# Patient Record
Sex: FEMALE | Race: WHITE | NOT HISPANIC OR LATINO | Employment: UNEMPLOYED | URBAN - METROPOLITAN AREA
[De-identification: names, ages, dates, MRNs, and addresses within clinical notes are randomized per-mention and may not be internally consistent; named-entity substitution may affect disease eponyms.]

---

## 2023-05-18 NOTE — PROGRESS NOTES
"[facility]ICAL [facility]AM[facility]    Patient: Kristin Fitzgerald    YOB: 1995  Sex:  female    Date of Exam: 5/19/23  MyChart Status: Activated    Arrival Time: 09 03 AM  Departure Time: 10 05 AM    Charge Phone (written on paperwork): [phone]    Vitals: /76   Pulse 76   Temp 98.4  F (36.9  C) (Oral)   Ht 1.74 m (5' 8.5\")   Wt 88.9 kg (196 lb)   SpO2 96%   BMI 29.37 kg/m      Patient Concerns:   Chief Complaint   Patient presents with     Physical      Kristin Fitzgerald presents to the clinic today for a [facility] physical exam. They have been there since April 4th, 2023. This is the patient's second time in treatment. Reports their drug of choice is/was alcohol , prescription drugs  and amphetamines, last used 2/13/2023. Patient denies a history of IVDU and endorses a history of incarceration for approximately [2 months]. Patient's living situation prior to treatment: lives with roommates. Report their last episode of [facility]IC was January 2023, no active genitourinary symptoms. Kristin Fitzgerald denies current chest pain, palpitations, SOB, cough, fever, chills, malaise, N/V/D, night sweats, unintentional weight loss, or abdominal pain.   Current health complaints: None    Has Mirena IUD. Placed 6 years when daughter was born.  Last PAP 2020 normal.     PHQ-2 Score:        5/19/2023     9:09 AM   PHQ-2 ( 1999 Pfizer)   Q1: Little interest or pleasure in doing things 1   Q2: Feeling down, depressed or hopeless 1   PHQ-2 Score 2      PHQ-9 score:        5/19/2023     9:09 AM   PHQ   PHQ-9 Total Score 4   Q9: Thoughts of better off dead/self-harm past 2 weeks Not at all       Medications:   Current Outpatient Medications   Medication Sig Dispense Refill     acetaminophen (TYLENOL) 500 MG tablet Take 500-1,000 mg by mouth every 6 hours as needed for mild pain       buPROPion (WELLBUTRIN XL) 150 MG 24 hr tablet Take 150 mg by mouth every morning       gabapentin (NEURONTIN) " [facility] ENCOUNTER  ORTHOPEDIC OFFICE VISIT    CHIEF COMPLAINT:  Office Visit (Left knee pain )      SUBJECTIVE:  [place] Em is a 65 year old female who presented requesting evaluation for left knee pain.  Patient reports she has had spine problems for years and has pain that radiates into her whole leg.  Patient reports pain along the global knee.  Describes as aching, burning, stabbing and sharp pain.  Patient reports she has seen Dr. Malone on Oct. 13th, for a hip injection that was beneficial.  Pain is worse with activity and relieved with rest.  She has had a left knee MRI done also.  She has also had a knee injection that was beneficial for about 30-35% of her pain.  Patient's  attended the appointment with her today.      PROBLEM LIST:  Patient Active Problem List   Diagnosis   • Obstructive sleep apnea (adult) (pediatric)   • Cervical spondylosis with myelopathy   • Spondylolisthesis of lumbar region   • Essential hypertension   • Depression with anxiety   • Plantar fasciitis of left foot   • History of alcohol abuse   • Colon polyps   • Family history of colon cancer   • Class 2 severe obesity due to excess calories with serious comorbidity and body mass index (BMI) of 35.0 to 35.9 in adult (CMS/HCC)   • Chronic pain of left lower extremity   • Generalized OA   • Peroneal tendinitis of left lower extremity   • RSD (reflex sympathetic dystrophy)   • Complex regional pain syndrome type 1 of left lower extremity   • Left foot pain   • Weakness of both lower extremities   • Impaired functional mobility, balance, gait, and endurance   • Neuritis of left sural nerve   • VICKIE on CPAP   • Esophageal dysmotility   • Preop examination   • Medicare annual wellness visit, subsequent   • GERD (gastroesophageal reflux disease)   • Escalante's esophagus without dysplasia   • Skin ulcer of third toe of left foot, limited to breakdown of skin (CMS/HCC)   • Major depressive disorder, recurrent episode,  400 MG capsule Take 400 mg by mouth 3 times daily       ibuprofen (ADVIL/MOTRIN) 800 MG tablet Take 800 mg by mouth every 8 hours as needed for moderate pain       Lidocaine (LIDOCAINE PAIN RELIEF) 4 % Patch Place onto the skin every 24 hours To prevent lidocaine toxicity, patient should be patch free for 12 hrs daily.       Melatonin 10 MG TABS tablet Take 10 mg by mouth nightly as needed for sleep       multivitamin w/minerals (MULTI-VITAMIN) tablet Take 1 tablet by mouth daily       naltrexone (DEPADE/REVIA) 50 MG tablet Take 50 mg by mouth daily       QUEtiapine (SEROQUEL) 50 MG tablet Take 50 mg by mouth 2 times daily       Vitamin D3 50 mcg (2000 units) tablet Take 1 tablet by mouth daily         ROS:  Review Of Systems  See HPI    General Physical Exam:  Constitutional:   awake, alert, cooperative, no apparent distress, and appears stated age   Eyes:   Lids and lashes normal, pupils equal, round and reactive to light, extra ocular muscles intact, sclera clear, conjunctiva normal   ENT:   Normocephalic, without obvious abnormality, atraumatic, sinuses nontender on palpation, external ears without lesions, oral pharynx with moist mucous membranes, tonsils without erythema or exudates, gums normal and good dentition.   Neck:   Supple, symmetrical, trachea midline, no adenopathy, thyroid symmetric, not enlarged and no tenderness, skin normal   Hematologic / Lymphatic:   no cervical lymphadenopathy   Back:   Symmetric, no curvature, spinous processes are non-tender on palpation, paraspinous muscles are non-tender on palpation, no costal vertebral tenderness   Lungs:   No increased work of breathing, good air exchange, clear to auscultation bilaterally, no crackles or wheezing   Cardiovascular:   Normal apical impulse, regular rate and rhythm, normal S1 and S2, no S3 or S4, and no murmur noted   Abdomen:   No scars, normal bowel sounds, soft, non-distended, non-tender, no masses palpated, no hepatosplenomegally  mild (CMS/HCC)     HISTORIES:  I have reviewed the past medical history, family history, social history, medications and allergies listed in the medical record as obtained by my nursing staff and support staff and agree with their documentation.  Social History     Tobacco Use   • Smoking status: Former Smoker     Packs/day: 0.75     Years: 48.00     Pack years: 36.00     Types: Cigarettes     Quit date: 10/25/2018     Years since quittin.0   • Smokeless tobacco: Never Used   Substance Use Topics   • Alcohol use: No     Comment: quit oct. 25 of 2018     REVIEW OF SYSTEMS:  12 point review as mentioned above.          OBJECTIVE:  PHYSICAL EXAMINATION:  Vitals:   Visit Vitals  Ht 5' 5\" (1.651 m)   Wt 88 kg (194 lb 0.1 oz)   BMI 32.28 kg/m²     Constitutional:  Well-developed, well-nourished female in no acute distress.  Skin: Warm, dry, intact without rash or lesion.  Neurologic:  Alert and oriented x3.  Lungs:  Respirations unlabored  Musculoskeletal:  Left knee  Mild to moderate patellofemoral crepitus  Full range of motion    No Effusion  Stable to varus & valgus stress  Medial Joint line tenderness; no lateral joint line tenderness    IMAGING STUDIES:   MRI results of the left knee were reviewed.  These demonstrate patellofemoral chondromalacia and a medial meniscus tear.         ASSESSMENT:  Left knee patellar arthritis  Left knee medial meniscus tear      PLAN:   Discussed arthroscopic left knee surgery vs total knee arthroplasty with the patient.  The patient agrees to schedule the surgery at this time.       Physician directed home exercise program provided for left knee.     Schedule a pre-op exam within 30 days before surgery.     Follow up 2 weeks post-op.        No orders of the defined types were placed in this encounter.    Instructions provided as documented in the after visit summary.    The patient indicated understanding of the diagnosis and agreed with the plan of care.    On 10/26/2022, Suad ELIZABETH    Chest / Breast:   Breasts symmetrical, skin without lesion(s), no nipple retraction or dimpling, no nipple discharge, no masses palpated, no axillary or supraclavicular adenopathy   Genitounirinary:   Not indicated today   Musculoskeletal:   There is no redness, warmth, or swelling of the joints.  Full range of motion noted.  Motor strength is 5 out of 5 all extremities bilaterally.  Tone is normal.   Neurologic:   Awake, alert, oriented to name, place and time.  Cranial nerves II-XII are grossly intact.  Motor is 5 out of 5 bilaterally.  Cerebellar finger to nose, heel to shin intact.  Sensory is intact.  Babinski down going, Romberg negative, and gait is normal.   Neuropsychiatric:   General: normal, calm and normal eye contact   Skin:   normal skin color, texture, turgor       All labs required for [facility] will be completed during this visit: HIV, Hepatitis A (IgM &IgG), Hepatitis B (IgM &IgG), Hepatitis C, Quantiferon Gold, Pregnancy Test     Allergies: No Known Allergies    Are there any conditions that may endanger the health of the staff or Clients in our residential program? No     Is there any reason why this applicant should not assist in the preparation of food? No    This applicant is okay to admit for services to [facility]? Yes     The above client is a mutual patient at [facility], and the [facility] would like our patient to continue taking her medication as prescribed upon discharging from [facility].     I authorize the above patient to take all of her medication with her upon discharging from [facility]. Yes     Diagnoses:     (Z00.00) Routine history and physical examination of adult  (primary encounter diagnosis)  Plan: Lipid panel    (Z59.3) Person living in residential institution  Plan: Hepatitis C Screen Reflex to HCV RNA Quant and         Genotype, HIV  GALEN Downey scribed the services personally performed by Emilia Bowman MD.      The documentation recorded by the scribe accurately and completely reflects the service(s) I personally performed and the decisions made by me.        Antigen Antibody Combo, Hepatitis        A Antibody IgG, Hepatitis A antibody IgM,         Hepatitis B Surface Antibody, Hepatitis B         surface antigen, Quantiferon-TB Gold Plus, HCG         qualitative urine  - Physical exam unremarkable  - No s/sx of infectious or communicable diseases   - Vocalizes commitment to treatment center's program to maintain sobriety    (Z11.3) Routine screening for STI (sexually transmitted infection)  Plan: HIV Antigen Antibody Combo, Chlamydia         trachomatis/Neisseria gonorrhoeae by PCR -         Clinic Collect, Trichomonas vaginalis DNA PCR,         Treponema Abs w Reflex to RPR and Titer    Medication Changes: MEDICATIONS:        - Continue other medications without change    Referrals   Please schedule with a dentist for routine cleaning.    Follow up plan   Follow up as needed. PAP due Dec 2023.    Tatiana Freedman NP     Fax completed forms to: [phone]

## 2023-05-19 ENCOUNTER — OFFICE VISIT (OUTPATIENT)
Dept: FAMILY MEDICINE | Facility: CLINIC | Age: 28
End: 2023-05-19
Payer: MEDICAID

## 2023-05-19 VITALS
BODY MASS INDEX: 29.03 KG/M2 | DIASTOLIC BLOOD PRESSURE: 76 MMHG | HEART RATE: 76 BPM | TEMPERATURE: 98.4 F | SYSTOLIC BLOOD PRESSURE: 110 MMHG | HEIGHT: 69 IN | OXYGEN SATURATION: 96 % | WEIGHT: 196 LBS

## 2023-05-19 DIAGNOSIS — Z78.9 PERSON LIVING IN RESIDENTIAL INSTITUTION: ICD-10-CM

## 2023-05-19 DIAGNOSIS — Z11.3 ROUTINE SCREENING FOR STI (SEXUALLY TRANSMITTED INFECTION): ICD-10-CM

## 2023-05-19 DIAGNOSIS — Z00.00 ROUTINE HISTORY AND PHYSICAL EXAMINATION OF ADULT: Primary | ICD-10-CM

## 2023-05-19 LAB
HCG UR QL: NEGATIVE
T VAGINALIS DNA SPEC QL NAA+PROBE: NOT DETECTED

## 2023-05-19 PROCEDURE — 87389 HIV-1 AG W/HIV-1&-2 AB AG IA: CPT | Mod: ORL

## 2023-05-19 PROCEDURE — 86706 HEP B SURFACE ANTIBODY: CPT | Mod: ORL

## 2023-05-19 PROCEDURE — 86481 TB AG RESPONSE T-CELL SUSP: CPT | Mod: ORL

## 2023-05-19 PROCEDURE — 86780 TREPONEMA PALLIDUM: CPT | Mod: ORL

## 2023-05-19 PROCEDURE — 87591 N.GONORRHOEAE DNA AMP PROB: CPT | Mod: ORL

## 2023-05-19 PROCEDURE — 87661 TRICHOMONAS VAGINALIS AMPLIF: CPT | Mod: ORL

## 2023-05-19 PROCEDURE — 86803 HEPATITIS C AB TEST: CPT | Mod: ORL

## 2023-05-19 PROCEDURE — 86709 HEPATITIS A IGM ANTIBODY: CPT | Mod: ORL

## 2023-05-19 PROCEDURE — 80061 LIPID PANEL: CPT | Mod: ORL

## 2023-05-19 PROCEDURE — 86708 HEPATITIS A ANTIBODY: CPT | Mod: ORL

## 2023-05-19 PROCEDURE — 87340 HEPATITIS B SURFACE AG IA: CPT | Mod: ORL

## 2023-05-19 RX ORDER — IBUPROFEN 800 MG/1
800 TABLET, FILM COATED ORAL EVERY 8 HOURS PRN
COMMUNITY

## 2023-05-19 RX ORDER — PHENOL 1.4 %
10 AEROSOL, SPRAY (ML) MUCOUS MEMBRANE
COMMUNITY
End: 2024-08-01

## 2023-05-19 RX ORDER — CHOLECALCIFEROL (VITAMIN D3) 50 MCG
1 TABLET ORAL DAILY
COMMUNITY
End: 2024-04-30

## 2023-05-19 RX ORDER — NALTREXONE HYDROCHLORIDE 50 MG/1
50 TABLET, FILM COATED ORAL DAILY
COMMUNITY
End: 2024-02-08

## 2023-05-19 RX ORDER — MULTIPLE VITAMINS W/ MINERALS TAB 9MG-400MCG
1 TAB ORAL DAILY
COMMUNITY
End: 2024-08-01

## 2023-05-19 RX ORDER — QUETIAPINE FUMARATE 50 MG/1
50 TABLET, FILM COATED ORAL 2 TIMES DAILY
COMMUNITY
End: 2023-12-04

## 2023-05-19 RX ORDER — LIDOCAINE 4 G/G
PATCH TOPICAL EVERY 24 HOURS
COMMUNITY
End: 2024-08-01

## 2023-05-19 RX ORDER — ACETAMINOPHEN 500 MG
500-1000 TABLET ORAL EVERY 6 HOURS PRN
COMMUNITY

## 2023-05-19 RX ORDER — GABAPENTIN 400 MG/1
400 CAPSULE ORAL 3 TIMES DAILY
COMMUNITY
End: 2023-12-04

## 2023-05-19 RX ORDER — BUPROPION HYDROCHLORIDE 150 MG/1
300 TABLET ORAL EVERY MORNING
COMMUNITY
End: 2024-04-17

## 2023-05-19 ASSESSMENT — ANXIETY QUESTIONNAIRES
6. BECOMING EASILY ANNOYED OR IRRITABLE: SEVERAL DAYS
7. FEELING AFRAID AS IF SOMETHING AWFUL MIGHT HAPPEN: SEVERAL DAYS
GAD7 TOTAL SCORE: 8
2. NOT BEING ABLE TO STOP OR CONTROL WORRYING: MORE THAN HALF THE DAYS
3. WORRYING TOO MUCH ABOUT DIFFERENT THINGS: MORE THAN HALF THE DAYS
GAD7 TOTAL SCORE: 8
IF YOU CHECKED OFF ANY PROBLEMS ON THIS QUESTIONNAIRE, HOW DIFFICULT HAVE THESE PROBLEMS MADE IT FOR YOU TO DO YOUR WORK, TAKE CARE OF THINGS AT HOME, OR GET ALONG WITH OTHER PEOPLE: SOMEWHAT DIFFICULT
5. BEING SO RESTLESS THAT IT IS HARD TO SIT STILL: NOT AT ALL
1. FEELING NERVOUS, ANXIOUS, OR ON EDGE: SEVERAL DAYS

## 2023-05-19 ASSESSMENT — PATIENT HEALTH QUESTIONNAIRE - PHQ9
SUM OF ALL RESPONSES TO PHQ QUESTIONS 1-9: 4
5. POOR APPETITE OR OVEREATING: SEVERAL DAYS

## 2023-05-19 NOTE — NURSING NOTE
"ROOM:2  KARLEY PARKER    Preferred Name: Preethi     How did you hear about us?  Other - [facility]    27 year old  Chief Complaint   Patient presents with     Physical       Blood pressure 110/76, pulse 76, temperature 98.4  F (36.9  C), temperature source Oral, height 1.74 m (5' 8.5\"), weight 88.9 kg (196 lb), SpO2 96 %. Body mass index is 29.37 kg/m .  BP completed using cuff size:        There is no problem list on file for this patient.      Wt Readings from Last 2 Encounters:   05/19/23 88.9 kg (196 lb)     BP Readings from Last 3 Encounters:   05/19/23 110/76       No Known Allergies    Current Outpatient Medications   Medication     acetaminophen (TYLENOL) 500 MG tablet     buPROPion (WELLBUTRIN XL) 150 MG 24 hr tablet     DM-Phenylephrine-Acetaminophen (COLD/FLU DAYTIME RELIEF PO)     gabapentin (NEURONTIN) 400 MG capsule     ibuprofen (ADVIL/MOTRIN) 800 MG tablet     Lidocaine (LIDOCAINE PAIN RELIEF) 4 % Patch     Melatonin 10 MG TABS tablet     multivitamin w/minerals (MULTI-VITAMIN) tablet     naltrexone (DEPADE/REVIA) 50 MG tablet     QUEtiapine (SEROQUEL) 50 MG tablet     Vitamin D3 50 mcg (2000 units) tablet     No current facility-administered medications for this visit.       Social History     Tobacco Use     Smoking status: Former     Types: Cigarettes     Smokeless tobacco: Never   Vaping Use     Vaping status: Former   Substance Use Topics     Alcohol use: Not Currently     Drug use: Not Currently     Types: Methamphetamines     Comment: xanax       Honoring Choices - Health Care Directive Guide offered to patient at time of visit.    Health Maintenance Due   Topic Date Due     YEARLY PREVENTIVE VISIT  Never done     ADVANCE CARE PLANNING  Never done     COVID-19 Vaccine (1) Never done     HIV SCREENING  Never done     HEPATITIS C SCREENING  Never done     PAP  Never done     INFLUENZA VACCINE (1) 09/01/2022         There is no immunization history on file for this patient.    No results found for: " PAP    No lab results found.        5/19/2023     9:09 AM   PHQ-2 ( 1999 Pfizer)   Q1: Little interest or pleasure in doing things 1   Q2: Feeling down, depressed or hopeless 1   PHQ-2 Score 2           5/19/2023     9:09 AM   PHQ-9 SCORE   PHQ-9 Total Score 4           5/19/2023     9:09 AM   SAV-7 SCORE   Total Score 8            View : No data to display.                Demetrio Bedoya    May 19, 2023 9:20 AM

## 2023-05-19 NOTE — LETTER
May 24, 2023      Preethi Fitzgerald  [address] [address]        Dear ,    We are writing to inform you of your test results.    Resulted Orders   Chlamydia trachomatis/Neisseria gonorrhoeae by PCR - Clinic Collect   Result Value Ref Range    Chlamydia Trachomatis Negative Negative      Comment:      Negative for C. trachomatis rRNA by transcription mediated amplification.   A negative result by transcription mediated amplification does not preclude the presence of infection because results are dependent on proper and adequate collection, absence of inhibitors and sufficient rRNA to be detected.    Neisseria gonorrhoeae Negative Negative      Comment:      Negative for N. gonorrhoeae rRNA by transcription mediated amplification. A negative result by transcription mediated amplification does not preclude the presence of C. trachomatis infection because results are dependent on proper and adequate collection, absence of inhibitors and sufficient rRNA to be detected.   Hepatitis C Screen Reflex to HCV RNA Quant and Genotype   Result Value Ref Range    Hepatitis C Antibody Nonreactive Nonreactive    Narrative    Assay performance characteristics have not been established for newborns, infants, and children.   HIV Antigen Antibody Combo   Result Value Ref Range    HIV Antigen Antibody Combo Nonreactive Nonreactive      Comment:      HIV-1 p24 Ag & HIV-1/HIV-2 Ab Not Detected   Hepatitis A Antibody IgG   Result Value Ref Range    Hepatitis A Antibody IgG Reactive (A) Nonreactive    Narrative    This assay cannot be used for the diagnosis of acute HAV infection.   Hepatitis A antibody IgM   Result Value Ref Range    Hepatitis A Antibody IgM Nonreactive Nonreactive      Comment:      IgM anti-HAV not detected. Does not exclude the possibility of recent exposure to or infection with HAV.   Hepatitis B Surface Antibody   Result Value Ref Range    Hepatitis B Surface Antibody Instrument Value 28.04  <8.00 m[IU]/mL    Hepatitis B Surface Antibody Reactive       Comment:      Patient is considered to be immune to infection with hepatitis B when the value is greater than or equal to 12.00 mIU/mL.   Hepatitis B surface antigen   Result Value Ref Range    Hepatitis B Surface Antigen Nonreactive Nonreactive   HCG qualitative urine   Result Value Ref Range    hCG Urine Qualitative Negative Negative      Comment:      This test is for screening purposes.  Results should be interpreted along with the clinical picture.  Confirmation testing is available if warranted by ordering [id number], HCG Quantitative Pregnancy.   Trichomonas vaginalis DNA PCR   Result Value Ref Range    Trichomonas vaginalis by PCR Not Detected Not Detected    Narrative    [employer] Xpert TV Assay, performed on the [employer], is a qualitative in vitro diagnostic test for the detection of Trichomonas vaginalis genomic DNA. The test utilizes automated real-time polymerase chain reaction (PCR). The Xpert TV Assay uses female and male urine specimens, endocervical swab specimens, or patient-collected vaginal swab specimens (collected in a clinical setting). The Xpert TV Assay is intended to aid in the diagnosis of trichomoniasis in symptomatic or asymptomatic individuals.   Treponema Abs w Reflex to RPR and Titer   Result Value Ref Range    Treponema Antibody Total Nonreactive Nonreactive   Lipid panel   Result Value Ref Range    Cholesterol 209 (H) <200 mg/dL    Triglycerides 96 <150 mg/dL    Direct Measure HDL 65 >=50 mg/dL    LDL Cholesterol Calculated 125 (H) <=100 mg/dL    Non HDL Cholesterol 144 (H) <130 mg/dL    Narrative    Cholesterol  Desirable:  <200 mg/dL    Triglycerides  Normal:  Less than 150 mg/dL  Borderline High:  150-199 mg/dL  High:  200-499 mg/dL  Very High:  Greater than or equal to 500 mg/dL    Direct Measure HDL  Female:  Greater than or equal to 50 mg/dL   Male:  Greater than or equal to 40 mg/dL    LDL  Cholesterol  Desirable:  <100mg/dL  Above Desirable:  100-129 mg/dL   Borderline High:  130-159 mg/dL   High:  160-189 mg/dL   Very High:  >= 190 mg/dL    Non HDL Cholesterol  Desirable:  130 mg/dL  Above Desirable:  130-159 mg/dL  Borderline High:  160-189 mg/dL  High:  190-219 mg/dL  Very High:  Greater than or equal to 220 mg/dL   Quantiferon TB Gold Plus Grey Tube   Result Value Ref Range    Quantiferon Nil Tube 0.27 IU/mL   Quantiferon TB Gold Plus Green Tube   Result Value Ref Range    Quantiferon TB1 Tube 0.38 IU/mL   Quantiferon TB Gold Plus Yellow Tube   Result Value Ref Range    Quantiferon TB2 Tube 0.54    Quantiferon TB Gold Plus Purple Tube   Result Value Ref Range    Quantiferon Mitogen 10.00 IU/mL   Quantiferon TB Gold Plus   Result Value Ref Range    Quantiferon-TB Gold Plus Negative Negative      Comment:      No interferon gamma response to M.tuberculosis antigens was detected. Infection with M.tuberculosis is unlikely, however a single negative result does not exclude infection. In patients at high risk for infection, a second test should be considered in accordance with the 2017 ATS/IDSA/CDC Clinical Pract  ice Guidelines for Diagnosis of Tuberculosis in Adults and Children     TB1 Ag minus Nil Value 0.11 IU/mL    TB2 Ag minus Nil Value 0.27 IU/mL    Mitogen minus Nil Result 9.73 IU/mL    Nil Result 0.27 IU/mL       The following are your recent test results.     Your hepatitis A antibody was positive. This either means your body was exposed to hepatitis A in the past or you were vaccinated. It does not mean you have an active infection. There is nothing further that you need to do.    Your cholesterol was a little elevated. This does not mean you need a medication to control your cholesterol. We first recommend lifestyle changes. I really like these two handouts if you have access to the Internet, to explain what the different types of cholesterol are and how you can impact your cholesterol  levels through diet and exercise. If you do not have access to the internet, I have summarized some of the main points of the handouts below.    CHOLESTEROL is a waxy substance. Your liver makes all the cholesterol you need. The rest of the cholesterol in your body comes from foods derived from animals such as meat, poultry and full-fat dairy products. The body uses cholesterol to form cell membranes, aid in digestion, convert Vitamin D in the skin and make hormones. Two types of lipoproteins carry cholesterol to and from cells. High density lipoproteins and low-density lipoproteins. Triglycerides are the most common type of fat in the body.     High density lipoproteins (HDL cholesterol) are called GOOD cholesterol because they remove cholesterol from the bloodstream and the artery walls. A healthy HDL-cholesterol level may protect  against heart attack and stroke. Studies show that low levels of HDL cholesterol increase the risk of heart disease.     Low density lipoproteins (LDL cholesterol) are considered BAD cholesterol. While they carry needed cholesterol to all parts of the body, too much LDL contributes to fatty buildups in arteries. This narrows  the arteries and increases the risk for heart attack, stroke and peripheral artery disease, or PAD. Triglycerides are the most common type of fat in the body. They store excess energy from your diet. A high  triglyceride level combined with high LDL (bad) cholesterol or low HDL (good) cholesterol is linked with fatty buildups within the artery walls, which increases the risk of heart attack and stroke.     What should I eat? Focus on foods low in saturated and trans fats such as:   A variety of fruits and vegetables.   A variety of whole grain foods such as whole-grain bread, cereal, pasta and brown rice. At least half of the servings should be whole grains.   Fat-free, 1% and low-fat milk products.   Skinless poultry and lean meats. When you choose to eat red meat  and pork, select options labeled  loin  and  round.  These cuts usually have the least amount of fat.   Fatty fish such as salmon, trout, albacore tuna and sardines. Enjoy at least 8 ounces of non-fried fish each week.   Unsalted nuts, seeds, and legumes (dried beans or peas).   Nontropical vegetable oils like canola, corn, olive oil     What should I limit?   Foods with a lot of sodium (salt)   Sweets and sugar-sweetened beverages   Red meats and fatty meats that aren t trimmed   Processed meats such as bologna, salami and sausage   Full-fat dairy products such as whole milk, cream, ice cream, butter and cheese   Baked goods made with saturated and trans fats such as donuts, cakes and cookies   Foods that list the words  hydrogenated oils  in the ingredients panel   Saturated oils like coconut oil, palm oil and palm kernel oil   Solid fats like shortening, stick margarine and lard   Fried foods    https://www.heart.org/-/media/Files/Health-Topics/Cholesterol/Cholesterol-Score-Explained-English.pdf  https://www.heart.org/-/media/Files/Health-Topics/Answers-by-Heart/How-Can-I-Improve-Cholesterol.pdf     Please do not hesitate to reach out with further questions or concerns.      Have a great week!    Sincerely,    JUDSON Rios, CNP

## 2023-05-20 LAB
C TRACH DNA SPEC QL PROBE+SIG AMP: NEGATIVE
CHOLEST SERPL-MCNC: 209 MG/DL
HAV IGG SER QL IA: REACTIVE
HAV IGM SERPL QL IA: NONREACTIVE
HBV SURFACE AB SERPL IA-ACNC: 28.04 M[IU]/ML
HBV SURFACE AB SERPL IA-ACNC: REACTIVE M[IU]/ML
HBV SURFACE AG SERPL QL IA: NONREACTIVE
HCV AB SERPL QL IA: NONREACTIVE
HDLC SERPL-MCNC: 65 MG/DL
HIV 1+2 AB+HIV1 P24 AG SERPL QL IA: NONREACTIVE
LDLC SERPL CALC-MCNC: 125 MG/DL
N GONORRHOEA DNA SPEC QL NAA+PROBE: NEGATIVE
NONHDLC SERPL-MCNC: 144 MG/DL
T PALLIDUM AB SER QL: NONREACTIVE
TRIGL SERPL-MCNC: 96 MG/DL

## 2023-05-22 LAB
GAMMA INTERFERON BACKGROUND BLD IA-ACNC: 0.27 IU/ML
M TB IFN-G BLD-IMP: NEGATIVE
M TB IFN-G CD4+ BCKGRND COR BLD-ACNC: 9.73 IU/ML
MITOGEN IGNF BCKGRD COR BLD-ACNC: 0.11 IU/ML
MITOGEN IGNF BCKGRD COR BLD-ACNC: 0.27 IU/ML
QUANTIFERON MITOGEN: 10 IU/ML
QUANTIFERON NIL TUBE: 0.27 IU/ML
QUANTIFERON TB1 TUBE: 0.38 IU/ML
QUANTIFERON TB2 TUBE: 0.54

## 2023-09-21 ENCOUNTER — OFFICE VISIT (OUTPATIENT)
Dept: FAMILY MEDICINE | Facility: CLINIC | Age: 28
End: 2023-09-21
Payer: MEDICAID

## 2023-09-21 VITALS
DIASTOLIC BLOOD PRESSURE: 71 MMHG | OXYGEN SATURATION: 98 % | HEART RATE: 71 BPM | HEIGHT: 68 IN | SYSTOLIC BLOOD PRESSURE: 111 MMHG | WEIGHT: 204 LBS | BODY MASS INDEX: 30.92 KG/M2 | TEMPERATURE: 97.5 F

## 2023-09-21 DIAGNOSIS — M54.6 CHRONIC MIDLINE THORACIC BACK PAIN: ICD-10-CM

## 2023-09-21 DIAGNOSIS — G89.29 CHRONIC MIDLINE THORACIC BACK PAIN: ICD-10-CM

## 2023-09-21 DIAGNOSIS — Z87.81 PERSONAL HISTORY OF TRAUMATIC FRACTURE: Primary | ICD-10-CM

## 2023-09-21 RX ORDER — TRAZODONE HYDROCHLORIDE 100 MG/1
100 TABLET ORAL AT BEDTIME
COMMUNITY
End: 2024-04-30
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
## 2023-11-22 ENCOUNTER — ANCILLARY PROCEDURE (OUTPATIENT)
Dept: GENERAL RADIOLOGY | Facility: CLINIC | Age: 28
End: 2023-11-22
Attending: NURSE PRACTITIONER
Payer: COMMERCIAL

## 2023-11-22 DIAGNOSIS — M54.6 CHRONIC MIDLINE THORACIC BACK PAIN: ICD-10-CM

## 2023-11-22 DIAGNOSIS — G89.29 CHRONIC MIDLINE THORACIC BACK PAIN: ICD-10-CM

## 2023-11-22 DIAGNOSIS — Z87.81 PERSONAL HISTORY OF TRAUMATIC FRACTURE: ICD-10-CM

## 2023-11-22 PROCEDURE — 72070 X-RAY EXAM THORAC SPINE 2VWS: CPT | Performed by: STUDENT IN AN ORGANIZED HEALTH CARE EDUCATION/TRAINING PROGRAM

## 2023-11-22 PROCEDURE — 72100 X-RAY EXAM L-S SPINE 2/3 VWS: CPT | Performed by: STUDENT IN AN ORGANIZED HEALTH CARE EDUCATION/TRAINING PROGRAM

## 2023-11-29 ENCOUNTER — VIRTUAL VISIT (OUTPATIENT)
Dept: FAMILY MEDICINE | Facility: CLINIC | Age: 28
End: 2023-11-29
Payer: COMMERCIAL

## 2023-11-29 DIAGNOSIS — G89.29 CHRONIC THORACIC BACK PAIN, UNSPECIFIED BACK PAIN LATERALITY: Primary | ICD-10-CM

## 2023-11-29 DIAGNOSIS — M54.6 CHRONIC THORACIC BACK PAIN, UNSPECIFIED BACK PAIN LATERALITY: Primary | ICD-10-CM
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
## 2023-12-06 NOTE — PROGRESS NOTES
"[facility] ACUTE OR FOLLOW UP VISIT    Patient: Kristin Fitzgerald YOB: 1995    Date of Exam: 12/07/23    Arrival Time: 08 16 AM  Departure Time: 10 05 AM    Charge Phone (written on paperwork): [phone]    Please be advised that this client resides in a facility in which narcotic medications are not permitted. If pain management is needed, please prescribe an alternative medication.     Kristin Fitzgerald is a 28 year old who presents for a pap smear, last pap 12/2020.  She notes she has had the Mirena for the last 7 years. She also notes that she has had a vaginal odor for few months. No problems with urination or itching. Discharge has been present that has been white/yellow. Has had BV before and would like to be tested. Last tested for STD's in May and denies sexual activity while being at [facility] since April of 2023.      Preethi is been in treatment for methamphetamines, she has been in treatment of some kind 10 times.      Do you need any refills on your Medications today? No    Review Of Systems   ROS: 10 point ROS neg other than the symptoms noted above in the HPI.    General Physical Exam:  Vitals: /77   Pulse 55   Temp 98  F (36.7  C) (Oral)   Ht 1.742 m (5' 8.6\")   Wt 92.1 kg (203 lb)   BMI 30.33 kg/m      Physical Exam  Vitals and nursing note reviewed.   Constitutional:       Appearance: Normal appearance.   Genitourinary:     General: Normal vulva.      Exam position: Lithotomy position.      Vagina: Normal.      Cervix: Normal.      Adnexa: Right adnexa normal and left adnexa normal.      Comments: IUD Strings visualized and intact.  Neurological:      Mental Status: She is alert.   Psychiatric:         Mood and Affect: Mood normal.         Behavior: Behavior normal.     Assessment / Plan:  (Z00.00) Healthcare maintenance  (primary encounter diagnosis)  Plan: Pap screen with HPV - recommended age 30 - 65         years    (N76.0) Vaginosis  Plan: " Multiplex Vaginal Panel by PCR    We will follow up with Preethi when labs are back.     Referrals Made:   NO REFERRALS MADE TODAY  If a referral was made to a [facility] and you don't get a call from central scheduling please call [phone].  If a Mammogram was ordered for you at [facility] call [phone] to schedule or change your appointment.  If you had an XRay/CT/Ultrasound/MRI ordered the number is [phone] to schedule or change your radiology appointment.     Follow up as needed    Medication changes made at today's visit: MEDICATIONS:        - Continue other medications without change    Kia Vazquez, HAROON Student   Cecille Friedman NP     I, Cecille Friedman DNP, APRN, FNP, ANP, reviewed and verified the nurse practitioner (NP)  student's documentation of the patient's history.  I performed the exam and medical decision making activities with the NP student, who was present for learning purposes.

## 2023-12-07 ENCOUNTER — OFFICE VISIT (OUTPATIENT)
Dept: FAMILY MEDICINE | Facility: CLINIC | Age: 28
End: 2023-12-07
Payer: COMMERCIAL

## 2023-12-07 VITALS
SYSTOLIC BLOOD PRESSURE: 127 MMHG | HEART RATE: 55 BPM | WEIGHT: 203 LBS | DIASTOLIC BLOOD PRESSURE: 77 MMHG | BODY MASS INDEX: 30.07 KG/M2 | HEIGHT: 69 IN | TEMPERATURE: 98 F

## 2023-12-07 DIAGNOSIS — Z00.00 HEALTHCARE MAINTENANCE: Primary | ICD-10-CM

## 2023-12-07 DIAGNOSIS — N76.0 VAGINOSIS: ICD-10-CM

## 2023-12-07 LAB
BACTERIAL VAGINOSIS VAG-IMP: POSITIVE
CANDIDA DNA VAG QL NAA+PROBE: NOT DETECTED
CANDIDA GLABRATA / CANDIDA KRUSEI DNA: NOT DETECTED
T VAGINALIS DNA VAG QL NAA+PROBE: NOT DETECTED

## 2023-12-07 PROCEDURE — G0145 SCR C/V CYTO,THINLAYER,RESCR: HCPCS | Mod: ORL | Performed by: NURSE PRACTITIONER

## 2023-12-07 PROCEDURE — 87624 HPV HI-RISK TYP POOLED RSLT: CPT | Mod: ORL | Performed by: NURSE PRACTITIONER

## 2023-12-07 PROCEDURE — 0352U MULTIPLEX VAGINAL PANEL BY PCR: CPT | Mod: ORL | Performed by: NURSE PRACTITIONER

## 2023-12-07 RX ORDER — CHLORHEXIDINE GLUCONATE ORAL RINSE 1.2 MG/ML
15 SOLUTION DENTAL 2 TIMES DAILY
COMMUNITY
End: 2024-07-15

## 2023-12-07 NOTE — NURSING NOTE
"ROOM:3  VALENTÍN SHELLEY    Preferred Name: Preethi     How did you hear about us?  Other - [facility]    28 year old  Chief Complaint   Patient presents with     pap     Follow up        Blood pressure 127/77, pulse 55, temperature 98  F (36.7  C), temperature source Oral, height 1.742 m (5' 8.6\"), weight 92.1 kg (203 lb). Body mass index is 30.33 kg/m .  BP completed using cuff size:        There is no problem list on file for this patient.      Wt Readings from Last 2 Encounters:   12/07/23 92.1 kg (203 lb)   09/21/23 92.5 kg (204 lb)     BP Readings from Last 3 Encounters:   12/07/23 127/77   09/21/23 111/71   05/19/23 110/76       No Known Allergies    Current Outpatient Medications   Medication     acetaminophen (TYLENOL) 500 MG tablet     buPROPion (WELLBUTRIN XL) 150 MG 24 hr tablet     ibuprofen (ADVIL/MOTRIN) 800 MG tablet     Lidocaine (LIDOCAINE PAIN RELIEF) 4 % Patch     Melatonin 10 MG TABS tablet     multivitamin w/minerals (MULTI-VITAMIN) tablet     naltrexone (DEPADE/REVIA) 50 MG tablet     traZODone (DESYREL) 100 MG tablet     Vitamin D3 50 mcg (2000 units) tablet     No current facility-administered medications for this visit.       Social History     Tobacco Use     Smoking status: Former     Types: Cigarettes     Smokeless tobacco: Never   Vaping Use     Vaping Use: Former   Substance Use Topics     Alcohol use: Not Currently     Drug use: Not Currently     Types: Methamphetamines     Comment: xanax       Honoring Choices - Health Care Directive Guide offered to patient at time of visit.    Health Maintenance Due   Topic Date Due     ADVANCE CARE PLANNING  Never done     COVID-19 Vaccine (1) Never done     PAP  Never done     INFLUENZA VACCINE (1) 09/01/2023         There is no immunization history on file for this patient.    No results found for: \"PAP\"    Recent Labs   Lab Test 05/19/23  1026   *   HDL 65   TRIG 96           11/29/2023     2:44 PM 5/19/2023     9:09 AM   PHQ-2 ( 1999 Pfizer) "   Q1: Little interest or pleasure in doing things 0 1   Q2: Feeling down, depressed or hopeless 0 1   PHQ-2 Score 0 2           5/19/2023     9:09 AM   PHQ-9 SCORE   PHQ-9 Total Score 4           5/19/2023     9:09 AM   SAV-7 SCORE   Total Score 8            No data to display                Demetrio Dorantesisaiah    December 7, 2023 8:27 AM

## 2023-12-11 DIAGNOSIS — N76.0 BACTERIAL VAGINOSIS: Primary | ICD-10-CM

## 2023-12-11 DIAGNOSIS — B96.89 BACTERIAL VAGINOSIS: Primary | ICD-10-CM

## 2023-12-11 RX ORDER — METRONIDAZOLE 500 MG/1
500 TABLET ORAL 2 TIMES DAILY
Qty: 14 TABLET | Refills: 0 | Status: SHIPPED | OUTPATIENT
Start: 2023-12-11 | End: 2023-12-18

## 2023-12-12 LAB
BKR LAB AP GYN ADEQUACY: NORMAL
BKR LAB AP GYN INTERPRETATION: NORMAL
BKR LAB AP HPV REFLEX: NORMAL
BKR LAB AP PREVIOUS ABNORMAL: NORMAL
PATH REPORT.COMMENTS IMP SPEC: NORMAL
PATH REPORT.COMMENTS IMP SPEC: NORMAL
PATH REPORT.RELEVANT HX SPEC: NORMAL

## 2023-12-13 LAB
HUMAN PAPILLOMA VIRUS 16 DNA: NEGATIVE
HUMAN PAPILLOMA VIRUS 18 DNA: NEGATIVE
HUMAN PAPILLOMA VIRUS FINAL DIAGNOSIS: ABNORMAL
HUMAN PAPILLOMA VIRUS OTHER HR: POSITIVE

## 2023-12-29 ENCOUNTER — TELEPHONE (OUTPATIENT)
Dept: PHYSICAL THERAPY | Facility: CLINIC | Age: 28
End: 2023-12-29
Payer: COMMERCIAL

## 2024-01-02 ENCOUNTER — THERAPY VISIT (OUTPATIENT)
Dept: PHYSICAL THERAPY | Facility: CLINIC | Age: 29
End: 2024-01-02
Attending: NURSE PRACTITIONER
Payer: COMMERCIAL

## 2024-01-02 DIAGNOSIS — G89.29 CHRONIC MIDLINE THORACIC BACK PAIN: ICD-10-CM

## 2024-01-02 DIAGNOSIS — M54.6 CHRONIC MIDLINE THORACIC BACK PAIN: ICD-10-CM

## 2024-01-02 DIAGNOSIS — Z87.81 PERSONAL HISTORY OF TRAUMATIC FRACTURE: Primary | ICD-10-CM

## 2024-01-02 PROCEDURE — 97110 THERAPEUTIC EXERCISES: CPT | Mod: GP

## 2024-01-02 PROCEDURE — 97112 NEUROMUSCULAR REEDUCATION: CPT | Mod: GP

## 2024-01-02 PROCEDURE — 97161 PT EVAL LOW COMPLEX 20 MIN: CPT | Mod: GP
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
## 2024-01-10 NOTE — PROGRESS NOTES
"[facility]EN[facility] ACUTE OR FOLLOW UP VISIT    Patient: Kristin Fitzgerald YOB: 1995    Date of Exam: 1/11/24    Arrival Time: 08 01 AM  Departure Time: 08 55 AM    Please be advised that this client resides in a facility in which narcotic medications are not permitted. If pain management is needed, please prescribe an alternative medication.     Kristin Fitzgerald is a 28 year old who presents for vaginal odor since prior to treatment for BV 12/7/23.  Negative for vaginal pain, itchiness, discharge, or urinary burning. Patient denies being sexually active or concerns for STI's. Did note some urinary urgency at times.     Do you need any refills on your Medications today? No    Review Of Systems   ROS: 10 point ROS neg other than the symptoms noted above in the HPI.    General Physical Exam:  Vitals: /72 (BP Location: Left arm, Patient Position: Sitting, Cuff Size: Adult Regular)   Pulse 53   Temp 98  F (36.7  C) (Oral)   Resp 16   Ht 1.73 m (5' 8.1\")   Wt 92.2 kg (203 lb 3.2 oz)   SpO2 98%   BMI 30.81 kg/m      Physical Exam  Vitals and nursing note reviewed.   Constitutional:       Appearance: Normal appearance.   Neurological:      Mental Status: She is alert and oriented to person, place, and time.   Psychiatric:         Mood and Affect: Mood normal.         Behavior: Behavior normal.         Thought Content: Thought content normal.         Judgment: Judgment normal.       Assessment / Plan:  (R39.15) Urinary urgency  (primary encounter diagnosis)  Plan: UA without Microscopic, Multiplex Vaginal Panel        by PCR    UA Negative         (N76.0,  B96.89) BV (bacterial vaginosis)  Plan: Multiplex Vaginal Panel by PCR    Educated patient on perineal hygiene such as changing underwear and wiping front to back. Discussed vaginal adrienne, such as what may be normal and abnormal. Will reach out to patient pending resulting of vaginal panel.      Referrals Made:   NO REFERRALS MADE " TODAY  If a referral was made to a [facility] and you don't get a call from central scheduling please call [phone].  If a Mammogram was ordered for you at [facility] call [phone] to schedule or change your appointment.  If you had an XRay/CT/Ultrasound/MRI ordered the number is [phone] to schedule or change your radiology appointment.     Follow up as needed.     Medication changes made at today's visit: MEDICATIONS:        - Continue other medications without change    Kia Vazquez RN, DNP Student   Cecille Friedman NP

## 2024-01-11 ENCOUNTER — OFFICE VISIT (OUTPATIENT)
Dept: FAMILY MEDICINE | Facility: CLINIC | Age: 29
End: 2024-01-11
Payer: COMMERCIAL

## 2024-01-11 VITALS
DIASTOLIC BLOOD PRESSURE: 72 MMHG | TEMPERATURE: 98 F | HEIGHT: 68 IN | HEART RATE: 53 BPM | WEIGHT: 203.2 LBS | OXYGEN SATURATION: 98 % | SYSTOLIC BLOOD PRESSURE: 110 MMHG | RESPIRATION RATE: 16 BRPM | BODY MASS INDEX: 30.8 KG/M2

## 2024-01-11 DIAGNOSIS — N76.0 BV (BACTERIAL VAGINOSIS): ICD-10-CM

## 2024-01-11 DIAGNOSIS — B96.89 BV (BACTERIAL VAGINOSIS): ICD-10-CM

## 2024-01-11 DIAGNOSIS — R39.15 URINARY URGENCY: Primary | ICD-10-CM

## 2024-01-11 LAB
ALBUMIN UR-MCNC: NEGATIVE MG/DL
APPEARANCE UR: CLEAR
BACTERIAL VAGINOSIS VAG-IMP: POSITIVE
BILIRUB UR QL STRIP: NEGATIVE
CANDIDA DNA VAG QL NAA+PROBE: NOT DETECTED
CANDIDA GLABRATA / CANDIDA KRUSEI DNA: NOT DETECTED
COLOR UR AUTO: YELLOW
GLUCOSE UR STRIP-MCNC: NEGATIVE MG/DL
HGB UR QL STRIP: NEGATIVE
KETONES UR STRIP-MCNC: NEGATIVE MG/DL
LEUKOCYTE ESTERASE UR QL STRIP: NEGATIVE
NITRATE UR QL: NEGATIVE
PH UR STRIP: 5.5 [PH] (ref 5–7)
SP GR UR STRIP: 1.02 (ref 1–1.03)
T VAGINALIS DNA VAG QL NAA+PROBE: NOT DETECTED
UROBILINOGEN UR STRIP-ACNC: 0.2 E.U./DL

## 2024-01-11 PROCEDURE — 0352U MULTIPLEX VAGINAL PANEL BY PCR: CPT | Mod: ORL | Performed by: NURSE PRACTITIONER

## 2024-01-11 RX ORDER — HYDROXYZINE PAMOATE 25 MG/1
1 CAPSULE ORAL 3 TIMES DAILY PRN
COMMUNITY
Start: 2023-12-11 | End: 2024-07-15

## 2024-01-11 ASSESSMENT — PAIN SCALES - GENERAL: PAINLEVEL: NO PAIN (0)

## 2024-01-11 NOTE — LETTER
January 17, 2024      Preethi JOHN Tijerinaarnoldo  [address] [address]        Dear ,    We are writing to inform you of your test results.    Test results indicate you may require additional follow up, see comment below.    [place], let me know if  you want to treat this, I know we discussed options.  A phone visit is fine.    Resulted Orders   UA without Microscopic   Result Value Ref Range    Color Urine Yellow Colorless, Straw, Light Yellow, Yellow    Appearance Urine Clear Clear    Glucose Urine Negative Negative mg/dL    Bilirubin Urine Negative Negative    Ketones Urine Negative Negative mg/dL    Specific Gravity Urine 1.025 1.003 - 1.035    Blood Urine Negative Negative    pH Urine 5.5 5.0 - 7.0    Protein Albumin Urine Negative Negative mg/dL    Urobilinogen Urine 0.2 0.2, 1.0 E.U./dL    Nitrite Urine Negative Negative    Leukocyte Esterase Urine Negative Negative   Multiplex Vaginal Panel by PCR   Result Value Ref Range    Bacterial Vaginosis Organism DNA Positive (A) Negative      Comment:      Indicator DNA target(s) related to bacterial vaginosis organisms is/are detected.  Organisms associated with bacterial vaginosis that are targeted in this assay include Atopobium spp., Bacterial Vaginosis-Associated Bacterium-2, and Megasphaera-1. Detected organisms are not reported individually.    Candida Group DNA Not Detected Not Detected      Comment:      Candida group species detected by this target include C. albicans, C. tropicalis, C. parapsilosis, C. dubliniensis.     Lexi glabrata / Lexi krusei DNA Not Detected Not Detected    Trichomonas vaginalis DNA Not Detected Not Detected    Narrative    The Xpert  Xpress MVP test, performed on the [employer]  Instrument Systems, is an automated, qualitative in vitro diagnostic test for the detection of DNA targets from anaerobic bacteria associated with bacterial vaginosis, Candida species associated with vulvovaginal candidiasis, and  Trichomonas vaginalis. The assay uses clinician-collected and self-collected vaginal swabs from patients who are symptomatic for vaginitis/ vaginosis. The Xpert  Xpress MVP test utilizes real-time polymerase chain reaction (PCR) for the amplification of specific DNA targets and utilizes fluorogenic target-specific hybridization probes to detect and differentiate DNA. It is intended to aid in the diagnosis of vaginal infections in women with a clinical presentation consistent with bacterial vaginosis, vulvovaginal candidiasis, or trichomoniasis.   The assay targets three anaerobic microorgansims that are associated with bacterial vaginosis (BV). Other organisms that are not detected by the Xpert  Xpress MVP test have also been reported to be associated with BV. The BV organism and Candida species targets of the Xpert  Xpress MVP test can be commensal in women; positive results must be considered in conjunction with other clinical and patient information to determine the disease status.       If you have any questions or concerns, please call the clinic at the number listed above.       Sincerely,      Cecille Friedman NP

## 2024-01-11 NOTE — NURSING NOTE
"ROOM:2  VALENTÍN SHELLEY    Preferred Name: Preethi     How did you hear about us?  Current Patient    28 year old  Chief Complaint   Patient presents with     RECHECK     Patient is here for a follow up. She had BV a few weeks ago and she started antibiotics after being diagnosed. The patient believes that some of her symptoms cleared up, but she still has an odor.        Blood pressure 110/72, pulse 53, temperature 98  F (36.7  C), temperature source Oral, resp. rate 16, height 1.73 m (5' 8.1\"), weight 92.2 kg (203 lb 3.2 oz), SpO2 98%. Body mass index is 30.81 kg/m .  BP completed using cuff size:        There is no problem list on file for this patient.      Wt Readings from Last 2 Encounters:   01/11/24 92.2 kg (203 lb 3.2 oz)   12/07/23 92.1 kg (203 lb)     BP Readings from Last 3 Encounters:   01/11/24 110/72   12/07/23 127/77   09/21/23 111/71       No Known Allergies    Current Outpatient Medications   Medication     acetaminophen (TYLENOL) 500 MG tablet     buPROPion (WELLBUTRIN XL) 150 MG 24 hr tablet     chlorhexidine 0.12 % solution     hydrOXYzine kamila (VISTARIL) 25 MG capsule     ibuprofen (ADVIL/MOTRIN) 800 MG tablet     levonorgestrel (MIRENA) 52 MG (20 mcg/day) IUD     Lidocaine (LIDOCAINE PAIN RELIEF) 4 % Patch     Melatonin 10 MG TABS tablet     multivitamin w/minerals (MULTI-VITAMIN) tablet     naltrexone (DEPADE/REVIA) 50 MG tablet     traZODone (DESYREL) 100 MG tablet     Vitamin D3 50 mcg (2000 units) tablet     No current facility-administered medications for this visit.       Social History     Tobacco Use     Smoking status: Former     Types: Cigarettes     Smokeless tobacco: Never   Vaping Use     Vaping Use: Former   Substance Use Topics     Alcohol use: Not Currently     Drug use: Not Currently     Types: Methamphetamines     Comment: xanax       Honoring Choices - Health Care Directive Guide offered to patient at time of visit.    Health Maintenance Due   Topic Date Due     ADVANCE CARE " "PLANNING  Never done     COVID-19 Vaccine (1) Never done     INFLUENZA VACCINE (1) 09/01/2023         There is no immunization history on file for this patient.    No results found for: \"PAP\"    Recent Labs   Lab Test 05/19/23  1026   *   HDL 65   TRIG 96           1/11/2024     8:14 AM 11/29/2023     2:44 PM   PHQ-2 ( 1999 Pfizer)   Q1: Little interest or pleasure in doing things 1 0   Q2: Feeling down, depressed or hopeless 1 0   PHQ-2 Score 2 0           5/19/2023     9:09 AM   PHQ-9 SCORE   PHQ-9 Total Score 4           5/19/2023     9:09 AM   SAV-7 SCORE   Total Score 8            No data to display                Tae Jay    January 11, 2024 8:16 AM    "

## 2024-01-12 ENCOUNTER — THERAPY VISIT (OUTPATIENT)
Dept: PHYSICAL THERAPY | Facility: CLINIC | Age: 29
End: 2024-01-12
Attending: NURSE PRACTITIONER
Payer: COMMERCIAL

## 2024-01-12 DIAGNOSIS — G89.29 CHRONIC MIDLINE THORACIC BACK PAIN: ICD-10-CM

## 2024-01-12 DIAGNOSIS — Z87.81 PERSONAL HISTORY OF TRAUMATIC FRACTURE: Primary | ICD-10-CM

## 2024-01-12 DIAGNOSIS — M54.6 CHRONIC MIDLINE THORACIC BACK PAIN: ICD-10-CM

## 2024-01-12 PROCEDURE — 97110 THERAPEUTIC EXERCISES: CPT | Mod: GP

## 2024-01-12 PROCEDURE — 97112 NEUROMUSCULAR REEDUCATION: CPT | Mod: GP

## 2024-02-06 NOTE — PROGRESS NOTES
[facility]     Patient: Kristin Fitzgerald YOB: 1995 is a 28 year old who is being evaluated via a billable Telephone visit.        How would you like to obtain your AVS? Mail a copy    Date of Exam: 2/08/24    Charge Phone (ask patient): [phone]    Please be advised that this client resides in a facility in which narcotic medications are not permitted. If pain management is needed, please prescribe an alternative medication.     Kritsin Fitzgerald is a 28 year old who presents for the following:    Chief Complaint   Patient presents with     Results     Kristin is here today to follow up on lab results from 1//11/24.  She is positive for bacterial vaginosis.   She had been positive a month before and was treated with metronidazole, she continues to have a malodorous vaginal discharge and the current test is again positive.      Do you need any refills on your Medications today? No    Review Of Systems  CONSTITUTIONAL: no fatigue, no unexpected change in weight  SKIN: no worrisome rashes, no worrisome moles, no worrisome lesions  EYES: no acute vision problems or changes  ENT: no ear problems, no mouth problems, no throat problems  RESP: no significant cough, no shortness of breath  CV: no chest pain, no palpitations, no new or worsening peripheral edema  GI: no nausea, no vomiting, no constipation, no diarrhea    Objective:  Vitals:  No vitals were obtained today due to virtual visit.    GENERAL: healthy, alert and no distress, PSYCH: Alert and oriented times 3; coherent speech, normal rate and volume, able to articulate logical thoughts, able to abstract reason, no tangential thoughts, no hallucinations or delusions, affect is normal and RESP: No audible wheeze, no cough. Able to talk in full sentences.  Remainder of exam unable to be completed to due to virtual visit.    Additional Comments:N/A    Assessment / Plan:  (N76.0,  B96.89) Bacterial vaginosis  (primary  encounter diagnosis)    Plan: doxycycline hyclate (VIBRA-TABS) 100 MG tablet    Referrals Made:   NO REFERRALS MADE TODAY    Follow Up:  Follow up as needed    Medication changed made at today's visit: MEDICATIONS:        - Continue other medications without change    Cecille Friedman NP     Appointment Duration:10 minutes

## 2024-02-08 ENCOUNTER — VIRTUAL VISIT (OUTPATIENT)
Dept: FAMILY MEDICINE | Facility: CLINIC | Age: 29
End: 2024-02-08
Payer: COMMERCIAL

## 2024-02-08 DIAGNOSIS — B96.89 BACTERIAL VAGINOSIS: Primary | ICD-10-CM

## 2024-02-08 DIAGNOSIS — N76.0 BACTERIAL VAGINOSIS: Primary | ICD-10-CM

## 2024-02-08 RX ORDER — DOXYCYCLINE HYCLATE 100 MG
100 TABLET ORAL 2 TIMES DAILY
Qty: 14 TABLET | Refills: 0 | Status: SHIPPED | OUTPATIENT
Start: 2024-02-08 | End: 2024-04-17

## 2024-02-14 ENCOUNTER — THERAPY VISIT (OUTPATIENT)
Dept: PHYSICAL THERAPY | Facility: CLINIC | Age: 29
End: 2024-02-14
Payer: COMMERCIAL

## 2024-02-14 DIAGNOSIS — Z87.81 PERSONAL HISTORY OF TRAUMATIC FRACTURE: Primary | ICD-10-CM

## 2024-02-14 DIAGNOSIS — M54.6 CHRONIC MIDLINE THORACIC BACK PAIN: ICD-10-CM

## 2024-02-14 DIAGNOSIS — G89.29 CHRONIC MIDLINE THORACIC BACK PAIN: ICD-10-CM

## 2024-02-14 PROCEDURE — 97110 THERAPEUTIC EXERCISES: CPT | Mod: GP

## 2024-02-14 PROCEDURE — 97112 NEUROMUSCULAR REEDUCATION: CPT | Mod: GP

## 2024-03-20 ENCOUNTER — THERAPY VISIT (OUTPATIENT)
Dept: PHYSICAL THERAPY | Facility: CLINIC | Age: 29
End: 2024-03-20
Payer: COMMERCIAL

## 2024-03-20 DIAGNOSIS — G89.29 CHRONIC MIDLINE THORACIC BACK PAIN: ICD-10-CM

## 2024-03-20 DIAGNOSIS — Z87.81 PERSONAL HISTORY OF TRAUMATIC FRACTURE: Primary | ICD-10-CM

## 2024-03-20 DIAGNOSIS — M54.6 CHRONIC MIDLINE THORACIC BACK PAIN: ICD-10-CM

## 2024-03-20 PROCEDURE — 97112 NEUROMUSCULAR REEDUCATION: CPT | Mod: GP

## 2024-03-20 PROCEDURE — 97110 THERAPEUTIC EXERCISES: CPT | Mod: GP

## 2024-03-20 NOTE — PROGRESS NOTES
DISCHARGE  Reason for Discharge: Patient has met all goals.  Patient chooses to discontinue therapy.    Equipment Issued: None    Discharge Plan: Patient to continue home program.    Referring Provider:  Cecille Friedman

## 2024-04-17 ENCOUNTER — OFFICE VISIT (OUTPATIENT)
Dept: FAMILY MEDICINE | Facility: CLINIC | Age: 29
End: 2024-04-17
Payer: COMMERCIAL

## 2024-04-17 VITALS
SYSTOLIC BLOOD PRESSURE: 113 MMHG | OXYGEN SATURATION: 96 % | RESPIRATION RATE: 16 BRPM | WEIGHT: 222.4 LBS | TEMPERATURE: 98.1 F | BODY MASS INDEX: 32.94 KG/M2 | HEIGHT: 69 IN | HEART RATE: 65 BPM | DIASTOLIC BLOOD PRESSURE: 75 MMHG

## 2024-04-17 DIAGNOSIS — L91.8 SKIN TAG: Primary | ICD-10-CM

## 2024-04-17 DIAGNOSIS — E66.811 CLASS 1 OBESITY WITH BODY MASS INDEX (BMI) OF 33.0 TO 33.9 IN ADULT, UNSPECIFIED OBESITY TYPE, UNSPECIFIED WHETHER SERIOUS COMORBIDITY PRESENT: ICD-10-CM

## 2024-04-17 PROCEDURE — 88305 TISSUE EXAM BY PATHOLOGIST: CPT | Mod: 26 | Performed by: PATHOLOGY

## 2024-04-17 PROCEDURE — 88305 TISSUE EXAM BY PATHOLOGIST: CPT | Mod: TC,ORL | Performed by: NURSE PRACTITIONER

## 2024-04-17 ASSESSMENT — PAIN SCALES - GENERAL: PAINLEVEL: NO PAIN (0)

## 2024-04-17 NOTE — PROGRESS NOTES
[facility]UTE [facility] FOLLOW UP VISIT    Patient: Kristin Fitzgerald YOB: 1995    Date of Exam: 4/17/24    Arrival Time: 02 26 PM  Departure Time: 03 55 PM    Charge Phone (written on paperwork): [phone]    Please be advised that this client resides in a facility in which narcotic medications are not permitted. If pain management is needed, please prescribe an alternative medication.     Kristin Fitzgerald is a 28 year old who presents for the following: skin tag.      HPI:    28-year-old female with past medical history methamphetamine use disorder (currently sober in [facility]) presents to have left groin skin tag evaluated.  Patient first noticed a skin tag approximately 1 week ago.  She reports that is painful, especially when she walks.  Otherwise, she denies fevers or chills.  She would like to have the skin tag removed.    Waking-patient reports increased weight gain since joining [facility].  She is interested in medication therapy.  She denies possible pregnancy.  She is currently exercising 4 to 5 days/week and has tried adjusting her diet to augment weight loss including decreased carbs/increased salads. No other acute concerns/symptoms at time of exam.      Do you need any refills on your Medications today? No    Review Of Systems  Review of Systems   Constitutional, HEENT, cardiovascular, pulmonary, gi and gu systems are negative, except as otherwise noted.        Past Medical History:   Diagnosis Date    Anxiety        No past surgical history on file.    Family History   Problem Relation Age of Onset    Cancer Mother     Mental Illness Mother     Diabetes Father     Substance Abuse Father     Cancer Maternal Grandmother     Cancer Maternal Grandfather     Diabetes Maternal Grandfather        Social History     Tobacco Use    Smoking status: Former     Types: Cigarettes    Smokeless tobacco: Never   Substance Use Topics    Alcohol use: Not Currently     Current Outpatient  "Medications   Medication Sig Dispense Refill    acetaminophen (TYLENOL) 500 MG tablet Take 500-1,000 mg by mouth every 6 hours as needed for mild pain      chlorhexidine 0.12 % solution Swish and spit 15 mLs in mouth 2 times daily      hydrOXYzine kamila (VISTARIL) 25 MG capsule Take 1 capsule by mouth 3 times daily as needed      ibuprofen (ADVIL/MOTRIN) 800 MG tablet Take 800 mg by mouth every 8 hours as needed for moderate pain      levonorgestrel (MIRENA) 52 MG (20 mcg/day) IUD by Intrauterine route once      Lidocaine (LIDOCAINE PAIN RELIEF) 4 % Patch Place onto the skin every 24 hours To prevent lidocaine toxicity, patient should be patch free for 12 hrs daily.      Melatonin 10 MG TABS tablet Take 10 mg by mouth nightly as needed for sleep      multivitamin w/minerals (MULTI-VITAMIN) tablet Take 1 tablet by mouth daily      traZODone (DESYREL) 100 MG tablet Take 100 mg by mouth At Bedtime      Vitamin D3 50 mcg (2000 units) tablet Take 1 tablet by mouth daily       No current facility-administered medications for this visit.         General Physical Exam:  Vitals: /75 (BP Location: Left arm, Patient Position: Sitting, Cuff Size: Adult Regular)   Pulse 65   Temp 98.1  F (36.7  C) (Oral)   Resp 16   Ht 1.742 m (5' 8.6\")   Wt 100.9 kg (222 lb 6.4 oz)   SpO2 96%   BMI 33.23 kg/m      Physical Exam  Exam conducted with a chaperone present.   Constitutional:       General: She is not in acute distress.     Appearance: She is not ill-appearing.   Cardiovascular:      Rate and Rhythm: Normal rate.   Pulmonary:      Effort: Pulmonary effort is normal. No respiratory distress.   Musculoskeletal:      Cervical back: Neck supple.   Skin:     Findings: Lesion present. No abscess.          Neurological:      General: No focal deficit present.      Mental Status: She is alert.   Psychiatric:         Thought Content: Thought content normal.         Judgment: Judgment normal.       Procedure: Written and verbal " consent obtained.  Patient advised on risks including infection, pain, and scar.  After discussion, patient agreeable to procedure. The area was prepped and appropriately anesthetized. Using the usual technique, skin tag excision with #11 scalpel was performed. The total area excised. Hemostasis achieved with pressure and cautery. Bacitacin applied to site. Site covered with bandaid.  The procedure was well tolerated and without complications. Specimen was sent.    Additional Comments:N/A    Assessment / Plan:      1. Skin tag    Patient tolerated procedure well without complication.Specimen sent to pathology. Wound care instructions provided.  Patient was instructed to be alert for any signs of cutaneous infection. Disposition pending results.      - Removal of up to 15 skin tags  - Specimen Handling Fee [78597.000]  - Surgical Pathology Exam    2. BMI 33.0-33.9,adult    In addition to diet/lifestyle modifications, we discussed different medications for medication.  Patient is not a good candidate for phentermine considering past history of methamphetamine use.  Will hold off on topiramate considering hx of anxiety. Offered to start trial of off-label metformin.  After discussing risk/benefits/side effects, patient agreeable to prescription.  Follow-up in 4 to 6 weeks for follow-up.    - metFORMIN (GLUCOPHAGE) 500 MG tablet; Take 1 tablet (500 mg) by mouth daily (with dinner) X 1 week then increase to bid with meals  Dispense: 60 tablet; Refill: 1          Referrals Made:   NO REFERRALS MADE TODAY  If a referral was made to a [facility] and you don't get a call from central scheduling please call [phone].  If a Mammogram was ordered for you at [facility] call [phone] to schedule or change your appointment.  If you had an XRay/CT/Ultrasound/MRI ordered the number is [phone] to schedule or change your radiology appointment.     Follow-up in 4 -6 weeks for metformin  evaluation, sooner if needed.    Medication changes made at today's visit: MEDICATIONS:   Orders Placed This Encounter   Medications    metFORMIN (GLUCOPHAGE) 500 MG tablet     Sig: Take 1 tablet (500 mg) by mouth daily (with dinner) X 1 week then increase to bid with meals     Dispense:  60 tablet     Refill:  1          - Continue other medications without change    All questions/concerns addressed. Patient stated understanding/agreement to plan of care.    JUDSON Rojas, CNP  [employer]    Note: Chart documentation was done in part with Dragon Voice Recognition software.  Although reviewed after completion, some word and grammatical errors may remain. Please contact author for any clarification or concerns.

## 2024-04-17 NOTE — LETTER
"April 19, 2024      Preethi Fitzgerald  [address] [address]        Dear ,    We are writing to inform you of your test results.    Your skin lesion was molluscum contagiosum. This is caused by a virus. These lesions typically resolve on their own between 6-18 months. These lesions can also be treated with cryotherapy (where they are frozen off).      Resulted Orders   Surgical Pathology Exam   Result Value Ref Range    Case Report       Surgical Pathology Report                         Case: [id number]                                  Authorizing Provider:  Valentin Rivera APRN CNP Collected:           04/17/2024 03:37 PM          Ordering Location:      [facility]         Received:            04/17/2024 03:49 PM                                 [facility]                                                         Pathologist:           Andrews Hearn MD                                                                           Specimen:    Groin, Left                                                                                Final Diagnosis       Skin, left groin, biopsy-  Molluscum contagiosum      Clinical Information       Skin tag x 1 week      Gross Description       A(A). Groin, Left, :  The specimen is received in formalin, labeled with the patient's name, medical record number and other identifying information designated \"left groin skin\". It consists of 0.4 cm fragment of white-tan wrinkled soft tissue.  The presumed resection margin is inked blue and the specimen is bisected and submitted entirely in formalin in 1 cassette.   [REESE Wong(ASCP)]      Microscopic Description       Microscopic performed      Performing Labs       The technical component of this testing was completed at [facility]      Case Images "         If you have any questions or concerns, please call the clinic at the number listed above.       Sincerely,      JUDSON Pierce CNP

## 2024-04-17 NOTE — NURSING NOTE
"ROOM:1  DRE ONEAL    Preferred Name: Preethi     How did you hear about us?  Other - [facility]     Services Provided? No    28 year old  Chief Complaint   Patient presents with    Skin Tags     Patient reports having a skin tag on inner-thigh (left)        Blood pressure 113/75, pulse 65, temperature 98.1  F (36.7  C), temperature source Oral, resp. rate 16, height 1.742 m (5' 8.6\"), weight 100.9 kg (222 lb 6.4 oz), SpO2 96%. Body mass index is 33.23 kg/m .  BP completed using cuff size:        There is no problem list on file for this patient.      Wt Readings from Last 2 Encounters:   04/17/24 100.9 kg (222 lb 6.4 oz)   01/11/24 92.2 kg (203 lb 3.2 oz)     BP Readings from Last 3 Encounters:   04/17/24 113/75   01/11/24 110/72   12/07/23 127/77       No Known Allergies    Current Outpatient Medications   Medication Sig Dispense Refill    acetaminophen (TYLENOL) 500 MG tablet Take 500-1,000 mg by mouth every 6 hours as needed for mild pain      chlorhexidine 0.12 % solution Swish and spit 15 mLs in mouth 2 times daily      hydrOXYzine kamila (VISTARIL) 25 MG capsule Take 1 capsule by mouth 3 times daily as needed      ibuprofen (ADVIL/MOTRIN) 800 MG tablet Take 800 mg by mouth every 8 hours as needed for moderate pain      levonorgestrel (MIRENA) 52 MG (20 mcg/day) IUD by Intrauterine route once      Lidocaine (LIDOCAINE PAIN RELIEF) 4 % Patch Place onto the skin every 24 hours To prevent lidocaine toxicity, patient should be patch free for 12 hrs daily.      Melatonin 10 MG TABS tablet Take 10 mg by mouth nightly as needed for sleep      multivitamin w/minerals (MULTI-VITAMIN) tablet Take 1 tablet by mouth daily      traZODone (DESYREL) 100 MG tablet Take 100 mg by mouth At Bedtime      Vitamin D3 50 mcg (2000 units) tablet Take 1 tablet by mouth daily      buPROPion (WELLBUTRIN XL) 150 MG 24 hr tablet Take 300 mg by mouth every morning (Patient not taking: Reported on 4/17/2024)      doxycycline hyclate " "(VIBRA-TABS) 100 MG tablet Take 1 tablet (100 mg) by mouth 2 times daily (Patient not taking: Reported on 4/17/2024) 14 tablet 0     No current facility-administered medications for this visit.       Social History     Tobacco Use    Smoking status: Former     Types: Cigarettes    Smokeless tobacco: Never   Vaping Use    Vaping status: Former   Substance Use Topics    Alcohol use: Not Currently    Drug use: Not Currently     Types: Methamphetamines     Comment: xanax       Honoring Choices - Health Care Directive Guide offered to patient at time of visit.    Health Maintenance Due   Topic Date Due    ADVANCE CARE PLANNING  Never done    INFLUENZA VACCINE (1) 09/01/2023    COVID-19 Vaccine (1 - 2023-24 season) Never done         There is no immunization history on file for this patient.    No results found for: \"PAP\"    Recent Labs   Lab Test 05/19/23  1026   *   HDL 65   TRIG 96           4/17/2024     2:27 PM 1/11/2024     8:14 AM   PHQ-2 ( 1999 Pfizer)   Q1: Little interest or pleasure in doing things 0 1   Q2: Feeling down, depressed or hopeless 0 1   PHQ-2 Score 0 2   Q1: Little interest or pleasure in doing things Not at all    Q2: Feeling down, depressed or hopeless Not at all    PHQ-2 Score 0            5/19/2023     9:09 AM   PHQ-9 SCORE   PHQ-9 Total Score 4           5/19/2023     9:09 AM   SAV-7 SCORE   Total Score 8            No data to display                Tae Jay    April 17, 2024 2:48 PM    "

## 2024-04-17 NOTE — PATIENT INSTRUCTIONS
.Wound Care Instructions    1.  Keep area dry today.    2.  Starting tomorrow wash gently with soap and water once daily.      3.  Apply Vaseline or antibiotic ointment to the area and cover with a bandage if desired.  Do not let it dry out and form a scab as this will make the resulting scar more noticeable.    4. Protect the area from sun for up to one year afterward as the scar is continuing to remodel.  Sun exposure will also make the resulting scar more noticeable.    5.  Call if the area is very red, tender, has a discharge or is very itchy while healing, or if you have any other questions.  These may be signs of early infection or allergy.

## 2024-04-19 LAB
PATH REPORT.COMMENTS IMP SPEC: NORMAL
PATH REPORT.COMMENTS IMP SPEC: NORMAL
PATH REPORT.FINAL DX SPEC: NORMAL
PATH REPORT.GROSS SPEC: NORMAL
PATH REPORT.MICROSCOPIC SPEC OTHER STN: NORMAL
PATH REPORT.RELEVANT HX SPEC: NORMAL
PHOTO IMAGE: NORMAL

## 2024-04-30 ENCOUNTER — OFFICE VISIT (OUTPATIENT)
Dept: FAMILY MEDICINE | Facility: CLINIC | Age: 29
End: 2024-04-30
Payer: COMMERCIAL

## 2024-04-30 VITALS
SYSTOLIC BLOOD PRESSURE: 111 MMHG | HEART RATE: 80 BPM | DIASTOLIC BLOOD PRESSURE: 75 MMHG | WEIGHT: 215.8 LBS | RESPIRATION RATE: 19 BRPM | BODY MASS INDEX: 31.96 KG/M2 | HEIGHT: 69 IN | TEMPERATURE: 98.1 F | OXYGEN SATURATION: 96 %

## 2024-04-30 DIAGNOSIS — R05.1 ACUTE COUGH: ICD-10-CM

## 2024-04-30 DIAGNOSIS — J10.1 INFLUENZA B: ICD-10-CM

## 2024-04-30 DIAGNOSIS — R07.0 THROAT PAIN: Primary | ICD-10-CM

## 2024-04-30 DIAGNOSIS — R09.81 NASAL CONGESTION: ICD-10-CM

## 2024-04-30 LAB
DEPRECATED S PYO AG THROAT QL EIA: NEGATIVE
FLUAV RNA SPEC QL NAA+PROBE: NEGATIVE
FLUBV RNA RESP QL NAA+PROBE: POSITIVE
GROUP A STREP BY PCR: NOT DETECTED
RSV RNA SPEC NAA+PROBE: NEGATIVE
SARS-COV-2 RNA RESP QL NAA+PROBE: NEGATIVE

## 2024-04-30 PROCEDURE — 87637 SARSCOV2&INF A&B&RSV AMP PRB: CPT | Mod: ORL | Performed by: NURSE PRACTITIONER

## 2024-04-30 PROCEDURE — 87651 STREP A DNA AMP PROBE: CPT | Mod: ORL | Performed by: NURSE PRACTITIONER

## 2024-04-30 RX ORDER — TRAZODONE HYDROCHLORIDE 50 MG/1
1-3 TABLET, FILM COATED ORAL
COMMUNITY
Start: 2024-04-17 | End: 2024-07-15

## 2024-04-30 RX ORDER — CALCIUM CARBONATE 750 MG/1
1-4 TABLET, CHEWABLE ORAL DAILY PRN
COMMUNITY
End: 2024-08-01

## 2024-04-30 RX ORDER — GABAPENTIN 400 MG/1
400 CAPSULE ORAL 3 TIMES DAILY
COMMUNITY

## 2024-04-30 RX ORDER — GUAIFENESIN 600 MG/1
1 TABLET, EXTENDED RELEASE ORAL EVERY 12 HOURS PRN
COMMUNITY
Start: 2024-01-10 | End: 2024-08-01

## 2024-04-30 RX ORDER — CHLORHEXIDINE GLUCONATE ORAL RINSE 1.2 MG/ML
15 SOLUTION DENTAL 2 TIMES DAILY
COMMUNITY

## 2024-04-30 RX ORDER — HYDROXYZINE PAMOATE 25 MG/1
1 CAPSULE ORAL 3 TIMES DAILY PRN
COMMUNITY
End: 2024-08-01

## 2024-04-30 ASSESSMENT — PAIN SCALES - GENERAL: PAINLEVEL: NO PAIN (0)

## 2024-04-30 NOTE — LETTER
May 1, 2024      Preethi Fitzgerald  [address] [address]        Dear ,    We are writing to inform you of your test results.    Your testing for Influenza B is positive. Because you are in communal living, I have sent a prescription for an antiviral medication to your pharmacy    Resulted Orders   Streptococcus A Rapid Screen w/Reflex to PCR   Result Value Ref Range    Group A Strep antigen Negative Negative   Group A Streptococcus PCR Throat Swab   Result Value Ref Range    Group A strep by PCR Not Detected Not Detected    Narrative    The Xpert Xpress Strep A test, performed on the [employer], is a rapid, qualitative in vitro diagnostic test for the detection of Streptococcus pyogenes (Group A ß-hemolytic Streptococcus, Strep A) in throat swab specimens from patients with signs and symptoms of pharyngitis. The Xpert Xpress Strep A test can be used as an aid in the diagnosis of Group A Streptococcal pharyngitis. The assay is not intended to monitor treatment for Group A Streptococcus infections. The Xpert Xpress Strep A test utilizes an automated real-time polymerase chain reaction (PCR) to detect Streptococcus pyogenes DNA.   Symptomatic Influenza A/B, RSV, & SARS-CoV2 PCR (COVID-19) Nasopharyngeal   Result Value Ref Range    Influenza A PCR Negative Negative    Influenza B PCR Positive (A) Negative    RSV PCR Negative Negative    SARS CoV2 PCR Negative Negative      Comment:      NEGATIVE: SARS-CoV-2 (COVID-19) RNA not detected, presumed negative.    Narrative    Testing was performed using the Xpert Xpress CoV2/Flu/RSV Assay on the [employer] Instrument. This test should be ordered for the detection of SARS-CoV-2, influenza, and RSV viruses in individuals who meet clinical and/or epidemiological criteria. Test performance is unknown in asymptomatic patients. This test is for in vitro diagnostic use under the FDA EUA for laboratories certified under CLIA to  perform high or moderate complexity testing. This test has not been FDA cleared or approved. A negative result does not rule out the presence of PCR inhibitors in the specimen or target RNA in concentration below the limit of detection for the assay. If only one viral target is positive but coinfection with multiple targets is suspected, the sample should be re-tested with another FDA cleared, approved, or authorized test, if coinfection would change clinical management. This test was validated by the [facility]. These laboratories are certified under the Clinical Laboratory Improvement Amendments of 1988 (CLIA-88) as qualified to perform high complexity laboratory testing.       If you have any questions or concerns, please call the clinic at the number listed above.       Sincerely,      JUDSON Lowe CNP

## 2024-04-30 NOTE — PATIENT INSTRUCTIONS
Sore throat, congestion and cough  Rapid strep negative, 24 hour culture pending  COVID/RSV/INFLUENZA pending  Comfort measures: Acetaminophen or ibuprofen as directed as needed for comfort. Salt water gargles  Maintain hydration  Soft diet.

## 2024-04-30 NOTE — PROGRESS NOTES
"[place] [facility] FOLLOW UP VISIT    Patient: Kristin Fitzgerald YOB: 1995    Date of Exam: 4/30/24    Arrival Time: 01 45 PM  Departure Time: Unknown    Charge Phone (written on paperwork): [phone]    Please be advised that this client resides in a facility in which narcotic medications are not permitted. If pain management is needed, please prescribe an alternative medication.     Kristin Fitzgerald is a 28 year old who presents for the following    No chief complaint on file.  28 year-old female currently in recovery at [facility] presents to clinic for evaluation of sore throat. Symptoms started 4 days ago with sore throat, fever and fatigue. Feels like hit by truck. Reports head and nasal congestion. Feels like can't breathe out of nose.  Cough productive yellow phlegm. Has been taking it easy. No smoking or asthma history.  Denies rash or stomach upset. Loose stool yesterday.  Feels achy all over.  Eating and drinking okay. No nausea.  Sleep okay     Do you need any refills on your Medications today? No    Review Of Systems  Review Of Systems  GEN: no fever currently. Positive for fatigue and malaise  HEENT: as per HPI: head and nasal congestion, sore throat.   RESP: productive cough. Denies SOB  CV: Negative CP  GI: negative N/V, abdominal pain. Diarrhea x 1  DERM: no rash    General Physical Exam:  Vitals:/75 (BP Location: Left arm, Patient Position: Sitting, Cuff Size: Adult Regular)   Pulse 80   Temp 98.1  F (36.7  C) (Oral)   Resp 19   Ht 1.76 m (5' 9.3\")   Wt 97.9 kg (215 lb 12.8 oz)   SpO2 96%   BMI 31.59 kg/m      GEN: alert female. NAD  HEENT: Eyes clear. EARS: RTM gray with LR. LTM retracted. LR. Nose: mildly edematous turbinates with scant nasal drainage.  OP: Posterior pharynx erythema. No exudate.   NECK: Supple. Negative Lymph  LUNGS: CTA with air entry throughout.   CV: HRRR.   SKIN clear.     RST negative.     Assessment / Plan:  1. Throat " pain  Comfort measures. Await TC PCR.  - Streptococcus A Rapid Screen w/Reflex to PCR  - Group A Streptococcus PCR Throat Swab  - Symptomatic Influenza A/B, RSV, & SARS-CoV2 PCR (COVID-19) Nasopharyngeal; Future  - Symptomatic Influenza A/B, RSV, & SARS-CoV2 PCR (COVID-19) Nasopharyngeal    2. Nasal congestion  Test for other infectious diseases given symptoms and communal living. Wear mask. Will notify of results.   - Symptomatic Influenza A/B, RSV, & SARS-CoV2 PCR (COVID-19) Nasopharyngeal; Future  - Symptomatic Influenza A/B, RSV, & SARS-CoV2 PCR (COVID-19) Nasopharyngeal    3. Acute cough  As above. Continue with current OTC medications already on list.   - Symptomatic Influenza A/B, RSV, & SARS-CoV2 PCR (COVID-19) Nasopharyngeal; Future  - Symptomatic Influenza A/B, RSV, & SARS-CoV2 PCR (COVID-19) Nasopharyngeal        Referrals Made:   NO REFERRALS MADE TODAY  If a referral was made to a [facility] and you don't get a call from central scheduling please call [phone].  If a Mammogram was ordered for you at [facility] call [phone] to schedule or change your appointment.  If you had an XRay/CT/Ultrasound/MRI ordered the number is [phone] to schedule or change your radiology appointment.     Follow up as needed    Medication changes made at today's visit: MEDICATIONS:   Orders Placed This Encounter   Medications    chlorhexidine (PERIOGARD) 0.12 % solution     Sig: Swish and spit 15 mLs in mouth 2 times daily    calcium carbonate 750 MG CHEW     Sig: Take 1-4 tablets by mouth daily as needed    Pseudoephedrine-APAP-DM (DAYTIME COLD/FLU PO)     Sig: Take 1 capsule by mouth every 4 hours as needed    Multiple Vitamins-Minerals (EMERGEN-C IMMUNE) PACK     Sig: Take 1 packet by mouth daily as needed    gabapentin (NEURONTIN) 400 MG capsule     Sig: Take 400 mg by mouth 3 times daily    menthol (COUGH DROP) 7 MG LOZG     Sig: Take 1 lozenge by mouth every 2 hours as needed  for cough    hydrOXYzine kamila (VISTARIL) 25 MG capsule     Sig: Take 1 capsule by mouth 3 times daily as needed for itching    MUCUS RELIEF 600 MG 12 hr tablet     Sig: Take 1 tablet by mouth every 12 hours as needed    VITAMIN D, ERGOCALCIFEROL, PO     Sig: Take 50 mcg by mouth nightly as needed    traZODone (DESYREL) 50 MG tablet     Sig: Take 1-3 tablets by mouth nightly as needed     Medications Discontinued During This Encounter   Medication Reason    Vitamin D3 50 mcg (2000 units) tablet Discontinued by another Health Care Provider (No AVS)    traZODone (DESYREL) 100 MG tablet Discontinued by another Health Care Provider (No AVS)          - Continue other medications without change    Influenza B positive. Last creatinine 2021 1.04 eGFR >60. Discussed with colleague, Cecille Friedman. Benefit of treating patient in communal living setting outweighs risk. Oseltamivir sent to pharmacy. JUDSON Goodwin CNP CNP

## 2024-04-30 NOTE — NURSING NOTE
"ROOM:2  MARIA ISABEL OGDEN    Preferred Name: Preethi     How did you hear about us?  Other - [facility]     Services Provided? No    28 year old  Chief Complaint   Patient presents with    Pharyngitis     Cough, head ache, last three days       Blood pressure 111/75, pulse 80, temperature 98.1  F (36.7  C), temperature source Oral, resp. rate 19, height 1.76 m (5' 9.3\"), weight 97.9 kg (215 lb 12.8 oz), SpO2 96%. Body mass index is 31.59 kg/m .  BP completed using cuff size:        There is no problem list on file for this patient.      Wt Readings from Last 2 Encounters:   04/30/24 97.9 kg (215 lb 12.8 oz)   04/17/24 100.9 kg (222 lb 6.4 oz)     BP Readings from Last 3 Encounters:   04/30/24 111/75   04/17/24 113/75   01/11/24 110/72       No Known Allergies    Current Outpatient Medications   Medication Sig Dispense Refill    acetaminophen (TYLENOL) 500 MG tablet Take 500-1,000 mg by mouth every 6 hours as needed for mild pain      calcium carbonate 750 MG CHEW Take 1-4 tablets by mouth daily as needed      chlorhexidine (PERIOGARD) 0.12 % solution Swish and spit 15 mLs in mouth 2 times daily      chlorhexidine 0.12 % solution Swish and spit 15 mLs in mouth 2 times daily      gabapentin (NEURONTIN) 400 MG capsule Take 400 mg by mouth 3 times daily      hydrOXYzine kamila (VISTARIL) 25 MG capsule Take 1 capsule by mouth 3 times daily as needed for itching      hydrOXYzine kamila (VISTARIL) 25 MG capsule Take 1 capsule by mouth 3 times daily as needed      ibuprofen (ADVIL/MOTRIN) 800 MG tablet Take 800 mg by mouth every 8 hours as needed for moderate pain      levonorgestrel (MIRENA) 52 MG (20 mcg/day) IUD by Intrauterine route once      Lidocaine (LIDOCAINE PAIN RELIEF) 4 % Patch Place onto the skin every 24 hours To prevent lidocaine toxicity, patient should be patch free for 12 hrs daily.      Melatonin 10 MG TABS tablet Take 10 mg by mouth nightly as needed for sleep      menthol (COUGH DROP) 7 MG LOZG Take 1 " "lozenge by mouth every 2 hours as needed for cough      metFORMIN (GLUCOPHAGE) 500 MG tablet Take 1 tablet (500 mg) by mouth daily (with dinner) X 1 week then increase to bid with meals 60 tablet 1    MUCUS RELIEF 600 MG 12 hr tablet Take 1 tablet by mouth every 12 hours as needed      Multiple Vitamins-Minerals (EMERGEN-C IMMUNE) PACK Take 1 packet by mouth daily as needed      multivitamin w/minerals (MULTI-VITAMIN) tablet Take 1 tablet by mouth daily      Pseudoephedrine-APAP-DM (DAYTIME COLD/FLU PO) Take 1 capsule by mouth every 4 hours as needed      traZODone (DESYREL) 50 MG tablet Take 1-3 tablets by mouth nightly as needed      VITAMIN D, ERGOCALCIFEROL, PO Take 50 mcg by mouth nightly as needed       No current facility-administered medications for this visit.       Social History     Tobacco Use    Smoking status: Former     Types: Cigarettes    Smokeless tobacco: Never   Vaping Use    Vaping status: Former   Substance Use Topics    Alcohol use: Not Currently    Drug use: Not Currently     Types: Methamphetamines     Comment: xanax       Honoring Choices - Health Care Directive Guide offered to patient at time of visit.    Health Maintenance Due   Topic Date Due    ADVANCE CARE PLANNING  Never done    INFLUENZA VACCINE (1) 09/01/2023    COVID-19 Vaccine (1 - 2023-24 season) Never done    YEARLY PREVENTIVE VISIT  05/19/2024         There is no immunization history on file for this patient.    No results found for: \"PAP\"    Recent Labs   Lab Test 05/19/23  1026   *   HDL 65   TRIG 96           4/17/2024     2:27 PM 1/11/2024     8:14 AM   PHQ-2 ( 1999 Pfizer)   Q1: Little interest or pleasure in doing things 0 1   Q2: Feeling down, depressed or hopeless 0 1   PHQ-2 Score 0 2   Q1: Little interest or pleasure in doing things Not at all    Q2: Feeling down, depressed or hopeless Not at all    PHQ-2 Score 0            5/19/2023     9:09 AM   PHQ-9 SCORE   PHQ-9 Total Score 4           5/19/2023     9:09 " AM   SAV-7 SCORE   Total Score 8            No data to display                Chel Gamboa, EMT    April 30, 2024 1:51 PM

## 2024-05-01 RX ORDER — OSELTAMIVIR PHOSPHATE 75 MG/1
75 CAPSULE ORAL 2 TIMES DAILY
Qty: 10 CAPSULE | Refills: 0 | Status: SHIPPED | OUTPATIENT
Start: 2024-05-01 | End: 2024-05-06

## 2024-06-24 ENCOUNTER — TELEPHONE (OUTPATIENT)
Dept: FAMILY MEDICINE | Facility: CLINIC | Age: 29
End: 2024-06-24

## 2024-06-24 DIAGNOSIS — E66.811 CLASS 1 OBESITY WITH BODY MASS INDEX (BMI) OF 33.0 TO 33.9 IN ADULT, UNSPECIFIED OBESITY TYPE, UNSPECIFIED WHETHER SERIOUS COMORBIDITY PRESENT: ICD-10-CM

## 2024-06-24 NOTE — TELEPHONE ENCOUNTER
[facility] phone call message - patient requesting a refill:    Full Medication Name: metFORMIN (GLUCOPHAGE)   Dose: 500 MG tablet      Pharmacy confirmed as   [facility] [place] [facility] - [place], [place] - [address]  [address]  [place] [address]  Phone: [phone] Fax: [phone]  : Yes    Medication tab checked to see if medication has been sent  Yes    Is patient out of medication: Yes    Did patient contact the pharmacy? Yes    Additional Comments: it looks like there is one refill on our side but the pharmacy is saying they have no refills      Would the patient like to be updated? Phone Call to number confirmed below  If a phone call, is it okay to leave a detailed message?: Yes  at Home number on file [phone] (home)     Primary language: English      needed? No    Call taken on June 24, 2024 at 2:18 PM by Chel Gamboa, EMT    Route to [facility] MED REFILLS TEAM     ++If medication is a controlled substance, please route directly to the provider++

## 2024-07-15 ENCOUNTER — OFFICE VISIT (OUTPATIENT)
Dept: FAMILY MEDICINE | Facility: CLINIC | Age: 29
End: 2024-07-15
Payer: COMMERCIAL

## 2024-07-15 VITALS
HEIGHT: 68 IN | OXYGEN SATURATION: 95 % | DIASTOLIC BLOOD PRESSURE: 80 MMHG | WEIGHT: 226 LBS | RESPIRATION RATE: 18 BRPM | BODY MASS INDEX: 34.25 KG/M2 | TEMPERATURE: 98.2 F | HEART RATE: 71 BPM | SYSTOLIC BLOOD PRESSURE: 116 MMHG

## 2024-07-15 DIAGNOSIS — F41.9 ANXIETY: Primary | ICD-10-CM

## 2024-07-15 DIAGNOSIS — K05.10 GINGIVITIS: ICD-10-CM

## 2024-07-15 DIAGNOSIS — G47.00 PERSISTENT INSOMNIA: ICD-10-CM

## 2024-07-15 RX ORDER — TRAZODONE HYDROCHLORIDE 100 MG/1
100 TABLET ORAL AT BEDTIME
Qty: 90 TABLET | Refills: 3 | Status: SHIPPED | OUTPATIENT
Start: 2024-07-15

## 2024-07-15 RX ORDER — HYDROXYZINE PAMOATE 25 MG/1
25 CAPSULE ORAL 3 TIMES DAILY PRN
Qty: 90 CAPSULE | Refills: 0 | Status: SHIPPED | OUTPATIENT
Start: 2024-07-15 | End: 2024-08-16

## 2024-07-15 RX ORDER — CHLORHEXIDINE GLUCONATE ORAL RINSE 1.2 MG/ML
15 SOLUTION DENTAL 2 TIMES DAILY
Qty: 473 ML | Refills: 2 | Status: SHIPPED | OUTPATIENT
Start: 2024-07-15 | End: 2024-09-05

## 2024-07-15 ASSESSMENT — PAIN SCALES - GENERAL: PAINLEVEL: NO PAIN (0)

## 2024-07-15 NOTE — NURSING NOTE
"ROOM:2  VALENTÍN SHELLEY    Preferred Name: Preethi     How did you hear about us?  Current Patient     Services Provided? No    28 year old  Chief Complaint   Patient presents with    Medication Request     Patient wants to restart trazodone and hydroxyzine. Patient wants to know if she could start taking a higher dose of Metformin. Patient also needs a Chlorhexidine mouthwash refill       Blood pressure 116/80, pulse 71, temperature 98.2  F (36.8  C), temperature source Oral, resp. rate 18, height 1.737 m (5' 8.4\"), weight 102.5 kg (226 lb), SpO2 95%. Body mass index is 33.96 kg/m .  BP completed using cuff size:        There is no problem list on file for this patient.      Wt Readings from Last 2 Encounters:   07/15/24 102.5 kg (226 lb)   04/30/24 97.9 kg (215 lb 12.8 oz)     BP Readings from Last 3 Encounters:   07/15/24 116/80   04/30/24 111/75   04/17/24 113/75       No Known Allergies    Current Outpatient Medications   Medication Sig Dispense Refill    acetaminophen (TYLENOL) 500 MG tablet Take 500-1,000 mg by mouth every 6 hours as needed for mild pain      calcium carbonate 750 MG CHEW Take 1-4 tablets by mouth daily as needed      chlorhexidine (PERIOGARD) 0.12 % solution Swish and spit 15 mLs in mouth 2 times daily      chlorhexidine 0.12 % solution Swish and spit 15 mLs in mouth 2 times daily      gabapentin (NEURONTIN) 400 MG capsule Take 400 mg by mouth 3 times daily      hydrOXYzine kamila (VISTARIL) 25 MG capsule Take 1 capsule by mouth 3 times daily as needed for itching      hydrOXYzine kamila (VISTARIL) 25 MG capsule Take 1 capsule by mouth 3 times daily as needed      ibuprofen (ADVIL/MOTRIN) 800 MG tablet Take 800 mg by mouth every 8 hours as needed for moderate pain      levonorgestrel (MIRENA) 52 MG (20 mcg/day) IUD by Intrauterine route once      Lidocaine (LIDOCAINE PAIN RELIEF) 4 % Patch Place onto the skin every 24 hours To prevent lidocaine toxicity, patient should be patch free for " "12 hrs daily.      Melatonin 10 MG TABS tablet Take 10 mg by mouth nightly as needed for sleep      menthol (COUGH DROP) 7 MG LOZG Take 1 lozenge by mouth every 2 hours as needed for cough      metFORMIN (GLUCOPHAGE) 500 MG tablet Take 1 tablet (500 mg) by mouth 2 times daily (with meals) 60 tablet 0    MUCUS RELIEF 600 MG 12 hr tablet Take 1 tablet by mouth every 12 hours as needed      Multiple Vitamins-Minerals (EMERGEN-C IMMUNE) PACK Take 1 packet by mouth daily as needed      multivitamin w/minerals (MULTI-VITAMIN) tablet Take 1 tablet by mouth daily      Pseudoephedrine-APAP-DM (DAYTIME COLD/FLU PO) Take 1 capsule by mouth every 4 hours as needed      traZODone (DESYREL) 50 MG tablet Take 1-3 tablets by mouth nightly as needed      VITAMIN D, ERGOCALCIFEROL, PO Take 50 mcg by mouth nightly as needed       No current facility-administered medications for this visit.       Social History     Tobacco Use    Smoking status: Former     Types: Cigarettes     Passive exposure: Past    Smokeless tobacco: Never   Vaping Use    Vaping status: Former   Substance Use Topics    Alcohol use: Not Currently    Drug use: Not Currently     Types: Methamphetamines     Comment: xanax       Honoring Choices - Health Care Directive Guide offered to patient at time of visit.    Health Maintenance Due   Topic Date Due    ADVANCE CARE PLANNING  Never done    COVID-19 Vaccine (1 - 2023-24 season) Never done    YEARLY PREVENTIVE VISIT  05/19/2024         There is no immunization history on file for this patient.    No results found for: \"PAP\"    Recent Labs   Lab Test 05/19/23  1026   *   HDL 65   TRIG 96           4/17/2024     2:27 PM 1/11/2024     8:14 AM   PHQ-2 ( 1999 Pfizer)   Q1: Little interest or pleasure in doing things 0 1   Q2: Feeling down, depressed or hopeless 0 1   PHQ-2 Score 0 2   Q1: Little interest or pleasure in doing things Not at all    Q2: Feeling down, depressed or hopeless Not at all    PHQ-2 Score 0  "           5/19/2023     9:09 AM   PHQ-9 SCORE   PHQ-9 Total Score 4           5/19/2023     9:09 AM   SAV-7 SCORE   Total Score 8            No data to display                Tae Jay    July 15, 2024 1:20 PM

## 2024-07-15 NOTE — PROGRESS NOTES
Kristin Fitzgerald is a 28 year old female who presents today for 3 concerns.  She is recently graduated from [facility]'s main program and is now at the [facility]- she needs refills on her medications.    Preethi is taking metformin for weight loss, she is taking 1000 mg per day.  She is concerned as she is actually gaining weight.  She is requesting to increase the dose or consider something else as it is not helping.  She needs a refill on her mouthwash.  She is seeing a dentist next week.      She would like to restart trazodone for insomnia and hydroxyzine for anxiety.  She was using both of those while at [facility] and they were helpful.    Review Of Systems   ROS: 10 point ROS neg other than the symptoms noted above in the HPI.      Past Medical History:   Diagnosis Date    Anxiety      History reviewed. No pertinent surgical history.  Social History     Socioeconomic History    Marital status: Single     Spouse name: Not on file    Number of children: Not on file    Years of education: Not on file    Highest education level: Not on file   Occupational History    Not on file   Tobacco Use    Smoking status: Former     Types: Cigarettes     Passive exposure: Past    Smokeless tobacco: Never   Vaping Use    Vaping status: Former   Substance and Sexual Activity    Alcohol use: Not Currently    Drug use: Not Currently     Types: Methamphetamines     Comment: xanax    Sexual activity: Not Currently     Partners: Male     Birth control/protection: I.U.D.   Other Topics Concern    Not on file   Social History Narrative    Not on file     Social Determinants of Health     Financial Resource Strain: Low Risk  (4/17/2024)    Financial Resource Strain     Within the past 12 months, have you or your family members you live with been unable to get utilities (heat, electricity) when it was really needed?: No   Food Insecurity: Low Risk  (4/17/2024)    Food Insecurity     Within the past 12 months, did you worry that your  "food would run out before you got money to buy more?: No     Within the past 12 months, did the food you bought just not last and you didn t have money to get more?: No   Transportation Needs: Low Risk  (4/17/2024)    Transportation Needs     Within the past 12 months, has lack of transportation kept you from medical appointments, getting your medicines, non-medical meetings or appointments, work, or from getting things that you need?: No   Physical Activity: Not on file   Stress: No Stress Concern Present (7/15/2024)    [facility] [employer] - Occupational Stress Questionnaire     Feeling of Stress : Not at all   Social Connections: Unknown (1/1/2022)    [facility] [employer] [employer]    Social Connections     Frequency of Communication with Friends and Family: Not on file   Interpersonal Safety: Low Risk  (4/17/2024)    Interpersonal Safety     Do you feel physically and emotionally safe where you currently live?: Yes     Within the past 12 months, have you been hit, slapped, kicked or otherwise physically hurt by someone?: No     Within the past 12 months, have you been humiliated or emotionally abused in other ways by your partner or ex-partner?: No   Housing Stability: Low Risk  (4/17/2024)    Housing Stability     Do you have housing? : Yes     Are you worried about losing your housing?: No     Family History   Problem Relation Age of Onset    Cancer Mother     Mental Illness Mother     Diabetes Father     Substance Abuse Father     Cancer Maternal Grandmother     Cancer Maternal Grandfather     Diabetes Maternal Grandfather        /80 (BP Location: Left arm, Patient Position: Sitting, Cuff Size: Adult Large)   Pulse 71   Temp 98.2  F (36.8  C) (Oral)   Resp 18   Ht 1.737 m (5' 8.4\")   Wt 102.5 kg (226 lb)   SpO2 95%   BMI 33.96 kg/m      Exam:  Constitutional: healthy, alert, and no distress  Psychiatric: " mentation appears normal and affect normal/bright    Assessment/Plan:  1. Anxiety    - hydrOXYzine kamila (VISTARIL) 25 MG capsule; Take 1 capsule (25 mg) by mouth 3 times daily as needed for anxiety  Dispense: 90 capsule; Refill: 0    2. Persistent insomnia    - traZODone (DESYREL) 100 MG tablet; Take 1 tablet (100 mg) by mouth at bedtime  Dispense: 90 tablet; Refill: 3    3. Gingivitis    - chlorhexidine 0.12 % solution; Swish and spit 15 mLs in mouth 2 times daily  Dispense: 473 mL; Refill: 2  Seeing dentist next week.    As for increasing the metformin, I suggested that Preethi see Jenelle Jo Pharm D to discuss options for weight loss medications or other support.  She is amenable to this.      Follow up prn.    Options for treatment and follow-up care were reviewed with the patient. Patient engaged in the decision making process and verbalized understanding of the options discussed and agreed with the final plan.

## 2024-08-01 ENCOUNTER — TELEPHONE (OUTPATIENT)
Dept: FAMILY MEDICINE | Facility: CLINIC | Age: 29
End: 2024-08-01

## 2024-08-01 ENCOUNTER — OFFICE VISIT (OUTPATIENT)
Dept: FAMILY MEDICINE | Facility: CLINIC | Age: 29
End: 2024-08-01
Payer: COMMERCIAL

## 2024-08-01 VITALS
BODY MASS INDEX: 33.86 KG/M2 | HEIGHT: 68 IN | SYSTOLIC BLOOD PRESSURE: 114 MMHG | DIASTOLIC BLOOD PRESSURE: 78 MMHG | TEMPERATURE: 97.9 F | HEART RATE: 72 BPM | OXYGEN SATURATION: 96 % | WEIGHT: 223.4 LBS

## 2024-08-01 DIAGNOSIS — E66.811 CLASS 1 OBESITY WITH BODY MASS INDEX (BMI) OF 33.0 TO 33.9 IN ADULT, UNSPECIFIED OBESITY TYPE, UNSPECIFIED WHETHER SERIOUS COMORBIDITY PRESENT: Primary | ICD-10-CM

## 2024-08-01 LAB
BASOPHILS # BLD AUTO: 0 10E3/UL (ref 0–0.2)
BASOPHILS NFR BLD AUTO: 1 %
EOSINOPHIL # BLD AUTO: 0.2 10E3/UL (ref 0–0.7)
EOSINOPHIL NFR BLD AUTO: 2 %
ERYTHROCYTE [DISTWIDTH] IN BLOOD BY AUTOMATED COUNT: 12.7 % (ref 10–15)
HCT VFR BLD AUTO: 40.9 % (ref 35–47)
HGB BLD-MCNC: 13.6 G/DL (ref 11.7–15.7)
IMM GRANULOCYTES # BLD: 0 10E3/UL
IMM GRANULOCYTES NFR BLD: 0 %
LYMPHOCYTES # BLD AUTO: 2.3 10E3/UL (ref 0.8–5.3)
LYMPHOCYTES NFR BLD AUTO: 33 %
MCH RBC QN AUTO: 29.1 PG (ref 26.5–33)
MCHC RBC AUTO-ENTMCNC: 33.3 G/DL (ref 31.5–36.5)
MCV RBC AUTO: 87 FL (ref 78–100)
MONOCYTES # BLD AUTO: 0.4 10E3/UL (ref 0–1.3)
MONOCYTES NFR BLD AUTO: 6 %
NEUTROPHILS # BLD AUTO: 4 10E3/UL (ref 1.6–8.3)
NEUTROPHILS NFR BLD AUTO: 58 %
NRBC # BLD AUTO: 0 10E3/UL
NRBC BLD AUTO-RTO: 0 /100
PLATELET # BLD AUTO: 266 10E3/UL (ref 150–450)
RBC # BLD AUTO: 4.68 10E6/UL (ref 3.8–5.2)
WBC # BLD AUTO: 6.9 10E3/UL (ref 4–11)

## 2024-08-01 PROCEDURE — 85025 COMPLETE CBC W/AUTO DIFF WBC: CPT | Mod: ORL | Performed by: NURSE PRACTITIONER

## 2024-08-01 PROCEDURE — 80053 COMPREHEN METABOLIC PANEL: CPT | Mod: ORL | Performed by: NURSE PRACTITIONER

## 2024-08-01 ASSESSMENT — ENCOUNTER SYMPTOMS
VOMITING: 0
SHORTNESS OF BREATH: 0
NAUSEA: 0
CHILLS: 0
PALPITATIONS: 0
DIARRHEA: 0
COUGH: 0
ABDOMINAL PAIN: 0
FEVER: 0

## 2024-08-01 NOTE — PROGRESS NOTES
Today's Date: Aug 1, 2024     Patient Kristin Fitzgerald 1995 presents to the clinic today to address   Chief Complaint   Patient presents with    Recheck Medication     Metformin               SUBJECTIVE     History of Present Illness:    28-year-old female with past medical history of methamphetamine use disorder (recently graduated from [employer] [facility]-now in [facility]), anxiety, and obesity presents to discuss metformin.  Metformin was started off label on 4/17/2024 to assist with weight loss.  Patient was also encouraged diet/lifestyle modifications.  Today, patient reports metformin has been ineffective.  She would like to pursue another medication.  She she has more agency over her food now that she is in the [facility].  She is still exercising 4 to 5 days/week and recently completed a 5K.  She denies personal/family history of thyroid cancer, pancreatitis, or multiple endocrine neoplasia type II syndrome. No other acute concerns/symptoms at time of exam.      Review of Systems   Constitutional:  Negative for chills and fever.   Respiratory:  Negative for cough and shortness of breath.    Cardiovascular:  Negative for chest pain and palpitations.   Gastrointestinal:  Negative for abdominal pain, diarrhea, nausea and vomiting.   Constitutional, HEENT, cardiovascular, pulmonary, gi and gu systems are negative, except as otherwise noted.      No Known Allergies     Current Outpatient Medications   Medication Sig Dispense Refill    acetaminophen (TYLENOL) 500 MG tablet Take 500-1,000 mg by mouth every 6 hours as needed for mild pain      chlorhexidine (PERIOGARD) 0.12 % solution Swish and spit 15 mLs in mouth 2 times daily      chlorhexidine 0.12 % solution Swish and spit 15 mLs in mouth 2 times daily 473 mL 2    hydrOXYzine kamila (VISTARIL) 25 MG capsule Take 1 capsule (25 mg) by mouth 3 times daily as needed for anxiety 90 capsule 0    ibuprofen (ADVIL/MOTRIN) 800 MG tablet Take 800 mg by mouth  "every 8 hours as needed for moderate pain      levonorgestrel (MIRENA) 52 MG (20 mcg/day) IUD by Intrauterine route once      metFORMIN (GLUCOPHAGE) 500 MG tablet Take 1 tablet (500 mg) by mouth 2 times daily (with meals) 60 tablet 0    traZODone (DESYREL) 100 MG tablet Take 1 tablet (100 mg) by mouth at bedtime 90 tablet 3    gabapentin (NEURONTIN) 400 MG capsule Take 400 mg by mouth 3 times daily       No current facility-administered medications for this visit.         Past Medical History:   Diagnosis Date    Anxiety         Family History   Problem Relation Age of Onset    Cancer Mother     Mental Illness Mother     Diabetes Father     Substance Abuse Father     Cancer Maternal Grandmother     Cancer Maternal Grandfather     Diabetes Maternal Grandfather         Social History     Tobacco Use    Smoking status: Former     Types: Cigarettes     Passive exposure: Past    Smokeless tobacco: Never   Vaping Use    Vaping status: Former   Substance Use Topics    Alcohol use: Not Currently    Drug use: Not Currently     Types: Methamphetamines     Comment: xanax        History   Sexual Activity    Sexual activity: Not Currently    Partners: Male    Birth control/ protection: I.U.D.            5/19/2023     9:09 AM   PHQ   PHQ-9 Total Score 4   Q9: Thoughts of better off dead/self-harm past 2 weeks Not at all          There is no immunization history on file for this patient.              OBJECTIVE     /78 (BP Location: Left arm, Patient Position: Sitting, Cuff Size: Adult Regular)   Pulse 72   Temp 97.9  F (36.6  C) (Oral)   Ht 1.737 m (5' 8.4\")   Wt 101.3 kg (223 lb 6.4 oz)   SpO2 96%   BMI 33.57 kg/m         Wt Readings from Last 4 Encounters:   08/01/24 101.3 kg (223 lb 6.4 oz)   07/15/24 102.5 kg (226 lb)   04/30/24 97.9 kg (215 lb 12.8 oz)   04/17/24 100.9 kg (222 lb 6.4 oz)         Labs:  Lab Results   Component Value Date    CHOL 209 (H) 05/19/2023    TRIG 96 05/19/2023    HDL 65 05/19/2023    "     Physical Exam  Constitutional:       General: She is not in acute distress.     Appearance: She is not ill-appearing.   Cardiovascular:      Rate and Rhythm: Normal rate.   Pulmonary:      Effort: Pulmonary effort is normal. No respiratory distress.   Musculoskeletal:      Cervical back: Neck supple.   Neurological:      General: No focal deficit present.      Mental Status: She is alert.   Psychiatric:         Thought Content: Thought content normal.         Judgment: Judgment normal.               ASSESSMENT/PLAN     1. Class 1 obesity with body mass index (BMI) of 33.0 to 33.9 in adult, unspecified obesity type, unspecified whether serious comorbidity present    This is a pleasant 28-year-old female with past medical history of methamphetamine use disorder (recently graduated from [employer] [facility]-now in [facility]), anxiety, and obesity who presents to discuss metformin.    Since starting metformin approximately 3 months ago as well as employing diet/lifestyle modifications, patient has gained 1 pound  (Weight loss from 4/30/2024 2/2 influenza infection/loose stools).    We discussed increasing metformin versus trying a different agent.  After discussion, patient opted for a new medication.  Patient is not a good candidate for phentermine considering past methamphetamine use disorder.  Hesitant to start topiramate or Contrave considering patient's underlying anxiety.  Considering absence of  personal/family history of thyroid cancer, pancreatitis, or multiple endocrine neoplasia type II syndrome, we discussed GLP-1 agonist.  Patient was advised on risk/benefits/side effects including nausea/GI distress.  We also discussed studies which demonstrated thyroid tumors in mice.  After discussion, patient agreeable to trial of GLP-1.    - Semaglutide-Weight Management (WEGOVY) 0.25 MG/0.5ML pen; Inject 0.25 mg subcutaneously once a week  Dispense: 2 mL; Refill: 0  - Comprehensive metabolic panel  - CBC with platelets  differential          Follow-Up:  - Follow up in 4-6 week(s), or if symptoms worsen or fail to improve.     Options for treatment and follow-up care were reviewed with the patient. Patient engaged in the decision making process and verbalized understanding of the options discussed and agreed with the final plan.  AVS printed and given to patient.    JUDSON Rojas [facility]  Nurse Practitioners [address] [id number] [phone]        Note: Chart documentation was done in part with Dragon Voice Recognition software.  Although reviewed after completion, some word and grammatical errors may remain. Please contact author for any clarification or concerns.

## 2024-08-01 NOTE — LETTER
August 2, 2024      Preethi Fitzgerald  [address] [address]        Dear ,    We are writing to inform you of your test results.    Your test results fall within the expected range(s) or remain unchanged from previous results.  Please continue with current treatment plan.    Resulted Orders   Comprehensive metabolic panel   Result Value Ref Range    Sodium 139 135 - 145 mmol/L    Potassium 4.4 3.4 - 5.3 mmol/L    Carbon Dioxide (CO2) 26 22 - 29 mmol/L    Anion Gap 11 7 - 15 mmol/L    Urea Nitrogen 15.6 6.0 - 20.0 mg/dL    Creatinine 0.89 0.51 - 0.95 mg/dL    GFR Estimate 89 >60 mL/min/1.73m2      Comment:      eGFR calculated using 2021 CKD-EPI equation.    Calcium 9.3 8.8 - 10.4 mg/dL      Comment:      Reference intervals for this test were updated on 7/16/2024 to reflect our healthy population more accurately. There may be differences in the flagging of prior results with similar values performed with this method. Those prior results can be interpreted in the context of the updated reference intervals.    Chloride 102 98 - 107 mmol/L    Glucose 93 70 - 99 mg/dL    Alkaline Phosphatase 84 40 - 150 U/L    AST 18 0 - 45 U/L    ALT 27 0 - 50 U/L    Protein Total 7.2 6.4 - 8.3 g/dL    Albumin 4.5 3.5 - 5.2 g/dL    Bilirubin Total 0.3 <=1.2 mg/dL   CBC with platelets and differential   Result Value Ref Range    WBC Count 6.9 4.0 - 11.0 10e3/uL    RBC Count 4.68 3.80 - 5.20 10e6/uL    Hemoglobin 13.6 11.7 - 15.7 g/dL    Hematocrit 40.9 35.0 - 47.0 %    MCV 87 78 - 100 fL    MCH 29.1 26.5 - 33.0 pg    MCHC 33.3 31.5 - 36.5 g/dL    RDW 12.7 10.0 - 15.0 %    Platelet Count 266 150 - 450 10e3/uL    % Neutrophils 58 %    % Lymphocytes 33 %    % Monocytes 6 %    % Eosinophils 2 %    % Basophils 1 %    % Immature Granulocytes 0 %    NRBCs per 100 WBC 0 <1 /100    Absolute Neutrophils 4.0 1.6 - 8.3 10e3/uL    Absolute Lymphocytes 2.3 0.8 - 5.3 10e3/uL    Absolute Monocytes 0.4 0.0 - 1.3 10e3/uL     Absolute Eosinophils 0.2 0.0 - 0.7 10e3/uL    Absolute Basophils 0.0 0.0 - 0.2 10e3/uL    Absolute Immature Granulocytes 0.0 <=0.4 10e3/uL    Absolute NRBCs 0.0 10e3/uL       If you have any questions or concerns, please call the clinic at the number listed above.       Sincerely,      JUDSON Pierce CNP

## 2024-08-01 NOTE — TELEPHONE ENCOUNTER
Prior Authorization Retail Medication Request    Medication/Dose: Wegovy 0.25MG/0.5ML  Diagnosis and ICD code (if different than what is on RX):    New/renewal/insurance change PA/secondary ins. PA:  Previously Tried and Failed:  see chart  Rationale:  see chart    Insurance   Primary: [facility]   Insurance ID:  [id number]     Secondary (if applicable):na  Insurance ID:  na    Pharmacy Information (if different than what is on RX)  Name:  [facility]  Phone:  [phone]  Fax:[phone]

## 2024-08-02 LAB
ALBUMIN SERPL BCG-MCNC: 4.5 G/DL (ref 3.5–5.2)
ALP SERPL-CCNC: 84 U/L (ref 40–150)
ALT SERPL W P-5'-P-CCNC: 27 U/L (ref 0–50)
ANION GAP SERPL CALCULATED.3IONS-SCNC: 11 MMOL/L (ref 7–15)
AST SERPL W P-5'-P-CCNC: 18 U/L (ref 0–45)
BILIRUB SERPL-MCNC: 0.3 MG/DL
BUN SERPL-MCNC: 15.6 MG/DL (ref 6–20)
CALCIUM SERPL-MCNC: 9.3 MG/DL (ref 8.8–10.4)
CHLORIDE SERPL-SCNC: 102 MMOL/L (ref 98–107)
CREAT SERPL-MCNC: 0.89 MG/DL (ref 0.51–0.95)
EGFRCR SERPLBLD CKD-EPI 2021: 89 ML/MIN/1.73M2
GLUCOSE SERPL-MCNC: 93 MG/DL (ref 70–99)
HCO3 SERPL-SCNC: 26 MMOL/L (ref 22–29)
POTASSIUM SERPL-SCNC: 4.4 MMOL/L (ref 3.4–5.3)
PROT SERPL-MCNC: 7.2 G/DL (ref 6.4–8.3)
SODIUM SERPL-SCNC: 139 MMOL/L (ref 135–145)

## 2024-08-02 NOTE — TELEPHONE ENCOUNTER
PA Initiation    Medication: SEMAGLUTIDE-WEIGHT MANAGEMENT 0.25 MG/0.5ML SC SOAJ  Insurance Company: [employer] - Phone [phone] Fax [phone]  Pharmacy Filling the Rx: [facility] [place] [place] [facility] - [place], [place] - [address] [place] [address], [address]  Filling Pharmacy Phone: [phone]  Filling Pharmacy Fax: [phone]  Start Date: 8/2/2024

## 2024-08-02 NOTE — RESULT ENCOUNTER NOTE
Can someone please send letter (Patient graduated from [facility] [employer] and is at [facility])

## 2024-08-02 NOTE — TELEPHONE ENCOUNTER
Prior Authorization Approval    Medication: SEMAGLUTIDE-WEIGHT MANAGEMENT 0.25 MG/0.5ML SC SOAJ  Authorization Effective Date: 7/3/2024  Authorization Expiration Date: 2025  Approved Dose/Quantity: as written  Reference #: [id number]   Insurance Company: [employer] - Phone [phone] Fax [phone]  Which Pharmacy is filling the prescription: [facility] - [place], [place] - [address] [place] [address], [address]  Pharmacy Notified: y  Patient Notified: Instructed pharmacy to notify patient once order is ready.

## 2024-08-05 ENCOUNTER — TELEPHONE (OUTPATIENT)
Dept: FAMILY MEDICINE | Facility: CLINIC | Age: 29
End: 2024-08-05

## 2024-08-05 NOTE — TELEPHONE ENCOUNTER
----- Message from Valentin Rivera sent at 8/2/2024  3:24 PM CDT -----  Can someone please send letter (Patient graduated from [employer] [facility] [employer] [facility] and is at [facility] [facility])

## 2024-08-13 DIAGNOSIS — F41.9 ANXIETY: ICD-10-CM

## 2024-08-16 RX ORDER — HYDROXYZINE PAMOATE 25 MG/1
CAPSULE ORAL
Qty: 90 CAPSULE | Refills: 0 | Status: SHIPPED | OUTPATIENT
Start: 2024-08-16

## 2024-08-16 NOTE — TELEPHONE ENCOUNTER
LVD:  8/1/2024  [facility]     Valentin Rivera, APRN CNP     Refilled per protocol.

## 2024-09-03 DIAGNOSIS — E66.811 CLASS 1 OBESITY WITH BODY MASS INDEX (BMI) OF 33.0 TO 33.9 IN ADULT, UNSPECIFIED OBESITY TYPE, UNSPECIFIED WHETHER SERIOUS COMORBIDITY PRESENT: ICD-10-CM

## 2024-09-03 NOTE — TELEPHONE ENCOUNTER
[facility] phone call message - patient requesting a refill:    Full Medication Name: Semaglutide-weight management (wegovy)     Dose: 0.24MG/0.5ML pen     Pharmacy confirmed as   [facility] [place] [facility] - [place], [place] - [address], [address]  [address]  [place] [address]  Phone: [phone] Fax: [phone]  : Yes    Medication tab checked to see if medication has been sent  Yes    Is patient out of medication: No.  7 days left    Did patient contact the pharmacy? Yes    Additional Comments: patient would like to up the dosage and she stated she occasionally has nausea and get hot flashes. I did let her know that she may need an appointment to up the dosage.    Would the patient like to be updated? Phone Call to number confirmed below  If a phone call, is it okay to leave a detailed message?: Yes  at Home number on file [phone] (home)     Primary language: English      needed? No    Call taken on September 3, 2024 at 11:52 AM by Chel Gamboa, EMT    Route to P [facility] MED REFILLS TEAM     ++If medication is a controlled substance, please route directly to the provider++

## 2024-09-05 ENCOUNTER — TELEPHONE (OUTPATIENT)
Dept: FAMILY MEDICINE | Facility: CLINIC | Age: 29
End: 2024-09-05

## 2024-09-05 DIAGNOSIS — K05.10 GINGIVITIS: ICD-10-CM

## 2024-09-05 RX ORDER — CHLORHEXIDINE GLUCONATE ORAL RINSE 1.2 MG/ML
15 SOLUTION DENTAL 2 TIMES DAILY
Qty: 473 ML | Refills: 2 | Status: SHIPPED | OUTPATIENT
Start: 2024-09-05

## 2024-09-05 NOTE — CONFIDENTIAL NOTE
[facility] phone call message - patient requesting a refill:    Full Medication Name: chlorhexidine 0.12 % solution      Pharmacy confirmed as   [facility] [place] [facility] - [place], [place] - [address]  [address]  [place] [address]  Phone: [phone] Fax: [phone]  : Yes    Medication tab checked to see if medication has been sent  Yes    Is patient out of medication: Yes    Did patient contact the pharmacy? No: UNKNOWN    Additional Comments:      Would the patient like to be updated? Do not contact the patient  If a phone call, is it okay to leave a detailed message?: Not Applicable  at Not Applicable      Primary language: English      needed? No    Call taken on September 5, 2024 at 2:27 PM by Lucy Figueroa    Route to P [facility] MED REFILLS TEAM     ++If medication is a controlled substance, please route directly to the provider++

## 2024-09-16 ENCOUNTER — OFFICE VISIT (OUTPATIENT)
Dept: FAMILY MEDICINE | Facility: CLINIC | Age: 29
End: 2024-09-16
Payer: COMMERCIAL

## 2024-09-16 VITALS
BODY MASS INDEX: 33.8 KG/M2 | WEIGHT: 223 LBS | HEART RATE: 67 BPM | OXYGEN SATURATION: 97 % | SYSTOLIC BLOOD PRESSURE: 114 MMHG | DIASTOLIC BLOOD PRESSURE: 75 MMHG | HEIGHT: 68 IN | TEMPERATURE: 98.5 F

## 2024-09-16 DIAGNOSIS — N89.8 VAGINAL DISCHARGE: Primary | ICD-10-CM

## 2024-09-16 DIAGNOSIS — N89.8 VAGINAL ODOR: ICD-10-CM

## 2024-09-16 DIAGNOSIS — N89.8 VAGINAL ITCHING: ICD-10-CM

## 2024-09-16 LAB
BACTERIAL VAGINOSIS VAG-IMP: NEGATIVE
CANDIDA DNA VAG QL NAA+PROBE: NOT DETECTED
CANDIDA GLABRATA / CANDIDA KRUSEI DNA: NOT DETECTED
T VAGINALIS DNA VAG QL NAA+PROBE: NOT DETECTED

## 2024-09-16 PROCEDURE — 0352U MULTIPLEX VAGINAL PANEL BY PCR: CPT | Mod: ORL | Performed by: NURSE PRACTITIONER

## 2024-09-16 NOTE — LETTER
September 19, 2024      Preethi Fitzgerald  [address] [address]        Dear ,    We are writing to inform you of your test results.    Your test results fall within the expected range(s) or remain unchanged from previous results.  Please continue with current treatment plan.    Resulted Orders   Multiplex Vaginal Panel by PCR   Result Value Ref Range    Bacterial Vaginosis Organism DNA Negative Negative      Comment:      Indicator DNA target(s) related to bacterial vaginosis organisms is/are not detected.  Organisms associated with bacterial vaginosis that are targeted in this assay include Atopobium spp., Bacterial Vaginosis-Associated Bacterium-2, and Megasphaera-1. Detected organisms are not reported individually.    Candida Group DNA Not Detected Not Detected      Comment:      Candida group species detected by this target include C. albicans, C. tropicalis, C. parapsilosis, C. dubliniensis.     Lexi glabrata / Lexi krusei DNA Not Detected Not Detected    Trichomonas vaginalis DNA Not Detected Not Detected    Narrative    The Xpert  Xpress MVP test, performed on the [employer]  Instrument Systems, is an automated, qualitative in vitro diagnostic test for the detection of DNA targets from anaerobic bacteria associated with bacterial vaginosis, Candida species associated with vulvovaginal candidiasis, and Trichomonas vaginalis. The assay uses clinician-collected and self-collected vaginal swabs from patients who are symptomatic for vaginitis/ vaginosis. The Xpert  Xpress MVP test utilizes real-time polymerase chain reaction (PCR) for the amplification of specific DNA targets and utilizes fluorogenic target-specific hybridization probes to detect and differentiate DNA. It is intended to aid in the diagnosis of vaginal infections in women with a clinical presentation consistent with bacterial vaginosis, vulvovaginal candidiasis, or trichomoniasis.   The assay targets three anaerobic  microorgansims that are associated with bacterial vaginosis (BV). Other organisms that are not detected by the Xpert  Xpress MVP test have also been reported to be associated with BV. The BV organism and Candida species targets of the Xpert  Xpress MVP test can be commensal in women; positive results must be considered in conjunction with other clinical and patient information to determine the disease status.       If you have any questions or concerns, please call the clinic at the number listed above.       Sincerely,      Cecille Friedman NP

## 2024-09-16 NOTE — NURSING NOTE
"ROOM:3  VALENTÍN SHELLEY    Preferred Name: Preethi     Social Determinants of Health   SDoH screening reviewed today: Yes     Services Provided? No    28 year old  Chief Complaint   Patient presents with    Vaginal Problem     Odor, discharge, itchiness, going on for a few weeks       Blood pressure 114/75, pulse 67, temperature 98.5  F (36.9  C), temperature source Oral, height 1.737 m (5' 8.4\"), weight 101.2 kg (223 lb), SpO2 97%. Body mass index is 33.51 kg/m .  BP completed using cuff size:        There is no problem list on file for this patient.      Wt Readings from Last 2 Encounters:   09/16/24 101.2 kg (223 lb)   08/01/24 101.3 kg (223 lb 6.4 oz)     BP Readings from Last 3 Encounters:   09/16/24 114/75   08/01/24 114/78   07/15/24 116/80       No Known Allergies    Current Outpatient Medications   Medication Sig Dispense Refill    acetaminophen (TYLENOL) 500 MG tablet Take 500-1,000 mg by mouth every 6 hours as needed for mild pain      chlorhexidine (PERIOGARD) 0.12 % solution Swish and spit 15 mLs in mouth 2 times daily      chlorhexidine 0.12 % solution Swish and spit 15 mLs in mouth 2 times daily. 473 mL 2    gabapentin (NEURONTIN) 400 MG capsule Take 400 mg by mouth 3 times daily      hydrOXYzine kamila (VISTARIL) 25 MG capsule TAKE 1 CAPSULE BY MOUTH THREE TIMES A DAY AS NEEDED FOR ANXIETY 90 capsule 0    ibuprofen (ADVIL/MOTRIN) 800 MG tablet Take 800 mg by mouth every 8 hours as needed for moderate pain      levonorgestrel (MIRENA) 52 MG (20 mcg/day) IUD by Intrauterine route once      Semaglutide-Weight Management (WEGOVY) 0.25 MG/0.5ML pen Inject 0.25 mg subcutaneously once a week. 2 mL 0    traZODone (DESYREL) 100 MG tablet Take 1 tablet (100 mg) by mouth at bedtime 90 tablet 3     No current facility-administered medications for this visit.       Social History     Tobacco Use    Smoking status: Former     Types: Cigarettes     Passive exposure: Past    Smokeless tobacco: Never   Vaping " "Use    Vaping status: Former   Substance Use Topics    Alcohol use: Not Currently    Drug use: Not Currently     Types: Methamphetamines     Comment: xanax       Honoring Choices - Health Care Directive Guide offered to patient at time of visit.    Health Maintenance Due   Topic Date Due    ADVANCE CARE PLANNING  Never done    YEARLY PREVENTIVE VISIT  05/19/2024    INFLUENZA VACCINE (1) 09/01/2024    COVID-19 Vaccine (1 - 2024-25 season) Never done         There is no immunization history on file for this patient.    No results found for: \"PAP\"    Recent Labs   Lab Test 08/01/24  0840 05/19/23  1026   LDL  --  125*   HDL  --  65   TRIG  --  96   ALT 27  --    CR 0.89  --    GFRESTIMATED 89  --    ALBUMIN 4.5  --    POTASSIUM 4.4  --            9/16/2024     1:14 PM 4/17/2024     2:27 PM   PHQ-2 ( 1999 [employer])   Q1: Little interest or pleasure in doing things 0 0   Q2: Feeling down, depressed or hopeless 0 0   PHQ-2 Score 0 0   Q1: Little interest or pleasure in doing things Not at all Not at all   Q2: Feeling down, depressed or hopeless Not at all Not at all   PHQ-2 Score 0 0           5/19/2023     9:09 AM   PHQ-9 SCORE   PHQ-9 Total Score 4           5/19/2023     9:09 AM   SAV-7 SCORE   Total Score 8            No data to display                Lucy Figueroa    September 16, 2024 1:21 PM    "

## 2024-09-16 NOTE — PROGRESS NOTES
"Kristin Fitzgerald is a 28 year old female who presents today with a \"few week\" history of vaginal odor, itching and some white discharge.  Preethi is not sexually active, she has not been for almost 3 years, so she is not concerned about an STI, she has been tested within that timeframe.      She has graduated from the main program at [facility] and is in the next level working with them.    Review Of Systems   ROS: 10 point ROS neg other than the symptoms noted above in the HPI.      Past Medical History:   Diagnosis Date    Anxiety      History reviewed. No pertinent surgical history.  Social History     Socioeconomic History    Marital status: Single     Spouse name: Not on file    Number of children: Not on file    Years of education: Not on file    Highest education level: Not on file   Occupational History    Not on file   Tobacco Use    Smoking status: Former     Types: Cigarettes     Passive exposure: Past    Smokeless tobacco: Never   Vaping Use    Vaping status: Former   Substance and Sexual Activity    Alcohol use: Not Currently    Drug use: Not Currently     Types: Methamphetamines     Comment: xanax    Sexual activity: Not Currently     Partners: Male     Birth control/protection: I.U.D.   Other Topics Concern    Not on file   Social History Narrative    Not on file     Social Determinants of Health     Financial Resource Strain: Low Risk  (4/17/2024)    Financial Resource Strain     Within the past 12 months, have you or your family members you live with been unable to get utilities (heat, electricity) when it was really needed?: No   Food Insecurity: Low Risk  (4/17/2024)    Food Insecurity     Within the past 12 months, did you worry that your food would run out before you got money to buy more?: No     Within the past 12 months, did the food you bought just not last and you didn t have money to get more?: No   Transportation Needs: Low Risk  (4/17/2024)    Transportation Needs     Within the past 12 " "months, has lack of transportation kept you from medical appointments, getting your medicines, non-medical meetings or appointments, work, or from getting things that you need?: No   Physical Activity: Not on file   Stress: No Stress Concern Present (9/16/2024)    [employer] [place] [employer] - Occupational Stress Questionnaire     Feeling of Stress : Not at all   Social Connections: Unknown (1/1/2022)    Received from [facility] [place]  [facility]    Social Connections     Frequency of Communication with Friends and Family: Not on file   Interpersonal Safety: Low Risk  (8/1/2024)    Interpersonal Safety     Do you feel physically and emotionally safe where you currently live?: Yes     Within the past 12 months, have you been hit, slapped, kicked or otherwise physically hurt by someone?: No     Within the past 12 months, have you been humiliated or emotionally abused in other ways by your partner or ex-partner?: No   Housing Stability: Low Risk  (4/17/2024)    Housing Stability     Do you have housing? : Yes     Are you worried about losing your housing?: No     Family History   Problem Relation Age of Onset    Cancer Mother     Mental Illness Mother     Diabetes Father     Substance Abuse Father     Cancer Maternal Grandmother     Cancer Maternal Grandfather     Diabetes Maternal Grandfather        /75 (BP Location: Left arm, Patient Position: Sitting, Cuff Size: Adult Regular)   Pulse 67   Temp 98.5  F (36.9  C) (Oral)   Ht 1.737 m (5' 8.4\")   Wt 101.2 kg (223 lb)   SpO2 97%   BMI 33.51 kg/m      Exam:  Constitutional: healthy, alert, and no distress  Psychiatric: mentation appears normal and affect normal/bright  Hematologic/Lymphatic/Immunologic: Normal cervical lymph nodes    Assessment/Plan:  1. Vaginal discharge    - Multiplex Vaginal Panel by PCR; Future    2. Vaginal odor    - Multiplex Vaginal Panel by PCR; Future      3. " Vaginal itching    - Multiplex Vaginal Panel by PCR; Future    We will follow up with potential treatment when the results are back.      Options for treatment and follow-up care were reviewed with the patient. Patient engaged in the decision making process and verbalized understanding of the options discussed and agreed with the final plan.

## 2024-10-01 ENCOUNTER — OFFICE VISIT (OUTPATIENT)
Dept: FAMILY MEDICINE | Facility: CLINIC | Age: 29
End: 2024-10-01
Payer: COMMERCIAL

## 2024-10-01 VITALS
TEMPERATURE: 98 F | OXYGEN SATURATION: 98 % | DIASTOLIC BLOOD PRESSURE: 77 MMHG | SYSTOLIC BLOOD PRESSURE: 115 MMHG | HEART RATE: 70 BPM | HEIGHT: 68 IN | WEIGHT: 222 LBS | BODY MASS INDEX: 33.65 KG/M2

## 2024-10-01 DIAGNOSIS — E66.811 CLASS 1 OBESITY WITHOUT SERIOUS COMORBIDITY WITH BODY MASS INDEX (BMI) OF 33.0 TO 33.9 IN ADULT, UNSPECIFIED OBESITY TYPE: Primary | ICD-10-CM

## 2024-10-01 NOTE — NURSING NOTE
"ROOM:3  [facility]    Preferred Name: Preethi     Social Determinants of Health   SDoH screening reviewed today: Yes     Services Provided? No    28 year old  Chief Complaint   Patient presents with    Refill Request     Wegovy, up dosage       Blood pressure 115/77, pulse 70, temperature 98  F (36.7  C), temperature source Oral, height 1.737 m (5' 8.4\"), weight 100.7 kg (222 lb), SpO2 98%. Body mass index is 33.36 kg/m .  BP completed using cuff size:        There is no problem list on file for this patient.      Wt Readings from Last 2 Encounters:   10/01/24 100.7 kg (222 lb)   09/16/24 101.2 kg (223 lb)     BP Readings from Last 3 Encounters:   10/01/24 115/77   09/16/24 114/75   08/01/24 114/78       No Known Allergies    Current Outpatient Medications   Medication Sig Dispense Refill    acetaminophen (TYLENOL) 500 MG tablet Take 500-1,000 mg by mouth every 6 hours as needed for mild pain      chlorhexidine (PERIOGARD) 0.12 % solution Swish and spit 15 mLs in mouth 2 times daily      chlorhexidine 0.12 % solution Swish and spit 15 mLs in mouth 2 times daily. 473 mL 2    gabapentin (NEURONTIN) 400 MG capsule Take 400 mg by mouth 3 times daily      hydrOXYzine kamila (VISTARIL) 25 MG capsule TAKE 1 CAPSULE BY MOUTH THREE TIMES A DAY AS NEEDED FOR ANXIETY 90 capsule 0    ibuprofen (ADVIL/MOTRIN) 800 MG tablet Take 800 mg by mouth every 8 hours as needed for moderate pain      levonorgestrel (MIRENA) 52 MG (20 mcg/day) IUD by Intrauterine route once      Semaglutide-Weight Management (WEGOVY) 0.25 MG/0.5ML pen Inject 0.25 mg subcutaneously once a week. 2 mL 0    traZODone (DESYREL) 100 MG tablet Take 1 tablet (100 mg) by mouth at bedtime 90 tablet 3     No current facility-administered medications for this visit.       Social History     Tobacco Use    Smoking status: Former     Types: Cigarettes     Passive exposure: Past    Smokeless tobacco: Never   Vaping Use    Vaping status: Former " "  Substance Use Topics    Alcohol use: Not Currently    Drug use: Not Currently     Types: Methamphetamines     Comment: xanax       Honoring Choices - Health Care Directive Guide offered to patient at time of visit.    Health Maintenance Due   Topic Date Due    ADVANCE CARE PLANNING  Never done    YEARLY PREVENTIVE VISIT  05/19/2024    INFLUENZA VACCINE (1) 09/01/2024    COVID-19 Vaccine (1 - 2024-25 season) Never done         There is no immunization history on file for this patient.    No results found for: \"PAP\"    Recent Labs   Lab Test 08/01/24  0840 05/19/23  1026   LDL  --  125*   HDL  --  65   TRIG  --  96   ALT 27  --    CR 0.89  --    GFRESTIMATED 89  --    ALBUMIN 4.5  --    POTASSIUM 4.4  --            9/16/2024     1:14 PM 4/17/2024     2:27 PM   PHQ-2 ( 1999 [employer])   Q1: Little interest or pleasure in doing things 0 0   Q2: Feeling down, depressed or hopeless 0 0   PHQ-2 Score 0 0   Q1: Little interest or pleasure in doing things Not at all Not at all   Q2: Feeling down, depressed or hopeless Not at all Not at all   PHQ-2 Score 0 0           5/19/2023     9:09 AM   PHQ-9 SCORE   PHQ-9 Total Score 4           5/19/2023     9:09 AM   SAV-7 SCORE   Total Score 8            No data to display                Lucy Figueroa    October 1, 2024 4:11 PM    "

## 2024-10-01 NOTE — PROGRESS NOTES
"       HPI       Kristin Fitzgerald is a 28 year old  who presents for   Chief Complaint   Patient presents with    Refill Request     Wegovy, up dosage     28 year-old female with history of Meth use, formerly at [facility] and now at outpatient living presents to clinic for follow up of weight gain.  Was started on semalgutide 0.25mg about 1.5 months ago. Has not noted much change in weight or craving since starting this.States early in treatment noted some reduction in appetite but did not last.  Would like to increase dose. Has tolerated well and denies N/V or GI upset. No injection site irritation.   Typical diet: no breakfast. Eats leftovers her dad brings. Eats red meats.  Living at [facility] [place] [facility]. Food is high in carbohydrates. Reports does not eat a lot of fried foods or fast foods.  Has not been told is DM. Has [facility] DM (Father) but he is currently diet controlled.    Review of chart: no TSH      Problem, Medication and Allergy Lists were reviewed and updated if needed..    Patient is an established patient of this clinic..         Review of Systems:   Review of Systems  GEN: negative for weight loss.   GI: Negative for N/V, stool pattern change  : has IUD In but due to be changed soon.   ENDO: negative for increased thirst, urination.   SKIN:  negative for irritation from weekly injections.        Physical Exam:     Vitals:    10/01/24 1608   BP: 115/77   BP Location: Left arm   Patient Position: Sitting   Cuff Size: Adult Regular   Pulse: 70   Temp: 98  F (36.7  C)   TempSrc: Oral   SpO2: 98%   Weight: 100.7 kg (222 lb)   Height: 1.737 m (5' 8.4\")     Body mass index is 33.36 kg/m .  Vitals were reviewed and were normal     Physical Exam  GEN: alert female in NAD  LUNGS CTA  CV: HRRR      Results:   No testing ordered today    Assessment and Plan        1. Class 1 obesity without serious comorbidity with body mass index (BMI) of 33.0 to 33.9 in adult, unspecified obesity type  Discussed use/SE of semaglutide " and increased dose to 0.5mg weekly. Recheck in 2 weeks. If no change in weight, craving, would be worth checking thyroid. Offered today; decline. Reviewed healthy nutrition: low fat, limit red meats. Referred to nutrition.     - Semaglutide-Weight Management (WEGOVY) 0.5 MG/0.5ML pen; Inject 0.5 mg subcutaneously once a week.  Dispense: 2 mL; Refill: 0  - Adult Nutrition  Referral    Schedule replacement of IUD.     There are no discontinued medications.    Options for treatment and follow-up care were reviewed with the patient. Kristin Fitzgerald  engaged in the decision making process and verbalized understanding of the options discussed and agreed with the final plan.    JUDSON Lowe CNP

## 2024-10-01 NOTE — PATIENT INSTRUCTIONS
Obesity  Increase semaglutide to 0.5mg weekly  Follow up 2 weeks  Consider TSH check in future  Referred to nutrition

## 2024-11-05 ENCOUNTER — TELEPHONE (OUTPATIENT)
Dept: FAMILY MEDICINE | Facility: CLINIC | Age: 29
End: 2024-11-05

## 2024-11-05 NOTE — TELEPHONE ENCOUNTER
TC to patient re: request to increase dose of Semaglutide. Dose was increased on 10/1/24. Because clinic is no closed, left message for patient that can no longer manage her semaglutide. She will need to reestablish care elsewhere. She was given refill on 10/1 for one more month and she was reminded of this. Kinza ROPER CNP

## 2024-12-02 DIAGNOSIS — K05.10 GINGIVITIS: ICD-10-CM

## 2024-12-04 RX ORDER — CHLORHEXIDINE GLUCONATE ORAL RINSE 1.2 MG/ML
SOLUTION DENTAL
Qty: 473 ML | Refills: 2 | OUTPATIENT
Start: 2024-12-04

## 2024-12-04 NOTE — TELEPHONE ENCOUNTER
Request refused, [facility] has closed effective date 10/31/24. Patient no longer under provider care, care must be established elsewhere.